# Patient Record
Sex: MALE | Race: OTHER | Employment: FULL TIME | URBAN - NONMETROPOLITAN AREA
[De-identification: names, ages, dates, MRNs, and addresses within clinical notes are randomized per-mention and may not be internally consistent; named-entity substitution may affect disease eponyms.]

---

## 2022-12-17 ENCOUNTER — APPOINTMENT (OUTPATIENT)
Dept: GENERAL RADIOLOGY | Age: 57
End: 2022-12-17
Attending: EMERGENCY MEDICINE

## 2022-12-17 ENCOUNTER — HOSPITAL ENCOUNTER (EMERGENCY)
Age: 57
Discharge: HOME | End: 2022-12-18
Attending: EMERGENCY MEDICINE

## 2022-12-17 VITALS
TEMPERATURE: 98.2 F | WEIGHT: 243.9 LBS | OXYGEN SATURATION: 99 % | DIASTOLIC BLOOD PRESSURE: 84 MMHG | HEIGHT: 66 IN | BODY MASS INDEX: 39.2 KG/M2 | RESPIRATION RATE: 17 BRPM | SYSTOLIC BLOOD PRESSURE: 120 MMHG | HEART RATE: 113 BPM

## 2022-12-17 DIAGNOSIS — E86.0 DEHYDRATION: Primary | ICD-10-CM

## 2022-12-17 LAB
ALBUMIN SERPL-MCNC: 3.2 G/DL (ref 3.5–5)
ALBUMIN/GLOB SERPL: 0.9 {RATIO} (ref 1.1–2.2)
ALP SERPL-CCNC: 123 U/L (ref 45–117)
ALT SERPL-CCNC: 31 U/L (ref 12–78)
ANION GAP SERPL CALC-SCNC: 10 MMOL/L (ref 5–15)
AST SERPL W P-5'-P-CCNC: 17 U/L (ref 15–37)
ATRIAL RATE: 120 BPM
BASOPHILS # BLD: 0 K/UL (ref 0–0.1)
BASOPHILS NFR BLD: 1 % (ref 0–1)
BILIRUB SERPL-MCNC: 0.7 MG/DL (ref 0.2–1)
BUN SERPL-MCNC: 46 MG/DL (ref 6–20)
BUN/CREAT SERPL: 37 (ref 12–20)
CA-I BLD-MCNC: 8.3 MG/DL (ref 8.5–10.1)
CALCULATED P AXIS, ECG09: 45 DEGREES
CALCULATED R AXIS, ECG10: -35 DEGREES
CALCULATED T AXIS, ECG11: 39 DEGREES
CHLORIDE SERPL-SCNC: 103 MMOL/L (ref 97–108)
CO2 SERPL-SCNC: 23 MMOL/L (ref 21–32)
CREAT SERPL-MCNC: 1.26 MG/DL (ref 0.7–1.3)
D DIMER PPP FEU-MCNC: 0.56 MG/L FEU (ref 0.19–0.5)
DIAGNOSIS, 93000: NORMAL
DIFFERENTIAL METHOD BLD: ABNORMAL
EOSINOPHIL # BLD: 0 K/UL (ref 0–0.4)
EOSINOPHIL NFR BLD: 0 % (ref 0–7)
ERYTHROCYTE [DISTWIDTH] IN BLOOD BY AUTOMATED COUNT: 13 % (ref 11.5–14.5)
ETHANOL SERPL-MCNC: <10 MG/DL
FLUAV RNA SPEC QL NAA+PROBE: NOT DETECTED
FLUBV RNA SPEC QL NAA+PROBE: NOT DETECTED
GLOBULIN SER CALC-MCNC: 3.6 G/DL (ref 2–4)
GLUCOSE BLD STRIP.AUTO-MCNC: 299 MG/DL (ref 65–100)
GLUCOSE SERPL-MCNC: 325 MG/DL (ref 65–100)
HCT VFR BLD AUTO: 24.9 % (ref 36.6–50.3)
HGB BLD-MCNC: 8.6 G/DL (ref 12.1–17)
IMM GRANULOCYTES # BLD AUTO: 0.1 K/UL (ref 0–0.04)
IMM GRANULOCYTES NFR BLD AUTO: 1 % (ref 0–0.5)
LACTATE SERPL-SCNC: 2.6 MMOL/L (ref 0.4–2)
LYMPHOCYTES # BLD: 1.1 K/UL (ref 0.8–3.5)
LYMPHOCYTES NFR BLD: 15 % (ref 12–49)
MAGNESIUM SERPL-MCNC: 1.3 MG/DL (ref 1.6–2.4)
MCH RBC QN AUTO: 29.8 PG (ref 26–34)
MCHC RBC AUTO-ENTMCNC: 34.5 G/DL (ref 30–36.5)
MCV RBC AUTO: 86.2 FL (ref 80–99)
MONOCYTES # BLD: 0.2 K/UL (ref 0–1)
MONOCYTES NFR BLD: 3 % (ref 5–13)
NEUTS SEG # BLD: 6.1 K/UL (ref 1.8–8)
NEUTS SEG NFR BLD: 80 % (ref 32–75)
NRBC # BLD: 0 K/UL (ref 0–0.01)
NRBC BLD-RTO: 0 PER 100 WBC
P-R INTERVAL, ECG05: 145 MS
PERFORMED BY, TECHID: ABNORMAL
PLATELET # BLD AUTO: 295 K/UL (ref 150–400)
PMV BLD AUTO: 10.5 FL (ref 8.9–12.9)
POTASSIUM SERPL-SCNC: 5.8 MMOL/L (ref 3.5–5.1)
PROT SERPL-MCNC: 6.8 G/DL (ref 6.4–8.2)
Q-T INTERVAL, ECG07: 316 MS
QRS DURATION, ECG06: 83 MS
QTC CALCULATION (BEZET), ECG08: 447 MS
RBC # BLD AUTO: 2.89 M/UL (ref 4.1–5.7)
SARS-COV-2, COV2: NOT DETECTED
SODIUM SERPL-SCNC: 136 MMOL/L (ref 136–145)
TROPONIN-HIGH SENSITIVITY: 24 NG/L (ref 0–76)
VENTRICULAR RATE, ECG03: 120 BPM
WBC # BLD AUTO: 7.6 K/UL (ref 4.1–11.1)

## 2022-12-17 PROCEDURE — 99285 EMERGENCY DEPT VISIT HI MDM: CPT

## 2022-12-17 PROCEDURE — 93005 ELECTROCARDIOGRAM TRACING: CPT

## 2022-12-17 PROCEDURE — 83735 ASSAY OF MAGNESIUM: CPT

## 2022-12-17 PROCEDURE — 82077 ASSAY SPEC XCP UR&BREATH IA: CPT

## 2022-12-17 PROCEDURE — 96361 HYDRATE IV INFUSION ADD-ON: CPT

## 2022-12-17 PROCEDURE — 82962 GLUCOSE BLOOD TEST: CPT

## 2022-12-17 PROCEDURE — 87040 BLOOD CULTURE FOR BACTERIA: CPT

## 2022-12-17 PROCEDURE — 84484 ASSAY OF TROPONIN QUANT: CPT

## 2022-12-17 PROCEDURE — 85025 COMPLETE CBC W/AUTO DIFF WBC: CPT

## 2022-12-17 PROCEDURE — 83605 ASSAY OF LACTIC ACID: CPT

## 2022-12-17 PROCEDURE — 87636 SARSCOV2 & INF A&B AMP PRB: CPT

## 2022-12-17 PROCEDURE — 36415 COLL VENOUS BLD VENIPUNCTURE: CPT

## 2022-12-17 PROCEDURE — 85379 FIBRIN DEGRADATION QUANT: CPT

## 2022-12-17 PROCEDURE — 74011250636 HC RX REV CODE- 250/636: Performed by: EMERGENCY MEDICINE

## 2022-12-17 PROCEDURE — 80053 COMPREHEN METABOLIC PANEL: CPT

## 2022-12-17 PROCEDURE — 96360 HYDRATION IV INFUSION INIT: CPT

## 2022-12-17 PROCEDURE — 71045 X-RAY EXAM CHEST 1 VIEW: CPT

## 2022-12-17 RX ORDER — SODIUM CHLORIDE 9 MG/ML
150 INJECTION, SOLUTION INTRAVENOUS ONCE
Status: COMPLETED | OUTPATIENT
Start: 2022-12-17 | End: 2022-12-17

## 2022-12-17 RX ADMIN — SODIUM CHLORIDE 1000 ML: 9 INJECTION, SOLUTION INTRAVENOUS at 21:38

## 2022-12-17 RX ADMIN — SODIUM CHLORIDE 1000 ML: 9 INJECTION, SOLUTION INTRAVENOUS at 22:39

## 2022-12-17 RX ADMIN — SODIUM CHLORIDE 150 ML/HR: 9 INJECTION, SOLUTION INTRAVENOUS at 21:37

## 2022-12-18 ENCOUNTER — APPOINTMENT (OUTPATIENT)
Dept: CT IMAGING | Age: 57
End: 2022-12-18
Attending: EMERGENCY MEDICINE

## 2022-12-18 ENCOUNTER — HOSPITAL ENCOUNTER (EMERGENCY)
Age: 57
Discharge: ANOTHER HOSPITAL | End: 2022-12-19
Attending: EMERGENCY MEDICINE

## 2022-12-18 DIAGNOSIS — K92.2 GASTROINTESTINAL HEMORRHAGE, UNSPECIFIED GASTROINTESTINAL HEMORRHAGE TYPE: Primary | ICD-10-CM

## 2022-12-18 LAB
ALBUMIN SERPL-MCNC: 3.4 G/DL (ref 3.5–5)
ALBUMIN/GLOB SERPL: 0.9 {RATIO} (ref 1.1–2.2)
ALP SERPL-CCNC: 114 U/L (ref 45–117)
ALT SERPL-CCNC: 29 U/L (ref 12–78)
ANION GAP SERPL CALC-SCNC: 7 MMOL/L (ref 5–15)
AST SERPL W P-5'-P-CCNC: 17 U/L (ref 15–37)
BACTERIA SPEC CULT: NORMAL
BASOPHILS # BLD: 0 K/UL (ref 0–0.1)
BASOPHILS NFR BLD: 0 % (ref 0–1)
BILIRUB SERPL-MCNC: 0.7 MG/DL (ref 0.2–1)
BUN SERPL-MCNC: 50 MG/DL (ref 6–20)
BUN/CREAT SERPL: 38 (ref 12–20)
CA-I BLD-MCNC: 8.3 MG/DL (ref 8.5–10.1)
CHLORIDE SERPL-SCNC: 102 MMOL/L (ref 97–108)
CO2 SERPL-SCNC: 23 MMOL/L (ref 21–32)
CREAT SERPL-MCNC: 1.31 MG/DL (ref 0.7–1.3)
DIFFERENTIAL METHOD BLD: ABNORMAL
EOSINOPHIL # BLD: 0 K/UL (ref 0–0.4)
EOSINOPHIL NFR BLD: 0 % (ref 0–7)
ERYTHROCYTE [DISTWIDTH] IN BLOOD BY AUTOMATED COUNT: 14 % (ref 11.5–14.5)
GLOBULIN SER CALC-MCNC: 3.6 G/DL (ref 2–4)
GLUCOSE SERPL-MCNC: 275 MG/DL (ref 65–100)
HCT VFR BLD AUTO: 22.9 % (ref 36.6–50.3)
HEMOCCULT SP1 STL QL: POSITIVE
HGB BLD-MCNC: 7.7 G/DL (ref 12.1–17)
IMM GRANULOCYTES # BLD AUTO: 0.1 K/UL (ref 0–0.04)
IMM GRANULOCYTES NFR BLD AUTO: 1 % (ref 0–0.5)
LYMPHOCYTES # BLD: 1.1 K/UL (ref 0.8–3.5)
LYMPHOCYTES NFR BLD: 15 % (ref 12–49)
MCH RBC QN AUTO: 29.4 PG (ref 26–34)
MCHC RBC AUTO-ENTMCNC: 33.6 G/DL (ref 30–36.5)
MCV RBC AUTO: 87.4 FL (ref 80–99)
MONOCYTES # BLD: 0.3 K/UL (ref 0–1)
MONOCYTES NFR BLD: 4 % (ref 5–13)
NEUTS SEG # BLD: 5.7 K/UL (ref 1.8–8)
NEUTS SEG NFR BLD: 80 % (ref 32–75)
NRBC # BLD: 0 K/UL (ref 0–0.01)
NRBC BLD-RTO: 0 PER 100 WBC
PLATELET # BLD AUTO: 274 K/UL (ref 150–400)
PMV BLD AUTO: 10.3 FL (ref 8.9–12.9)
POTASSIUM SERPL-SCNC: 4.4 MMOL/L (ref 3.5–5.1)
PROT SERPL-MCNC: 7 G/DL (ref 6.4–8.2)
RBC # BLD AUTO: 2.62 M/UL (ref 4.1–5.7)
SODIUM SERPL-SCNC: 132 MMOL/L (ref 136–145)
SPECIAL REQUESTS,SREQ: NORMAL
TROPONIN-HIGH SENSITIVITY: 8 NG/L (ref 0–76)
WBC # BLD AUTO: 7.2 K/UL (ref 4.1–11.1)

## 2022-12-18 PROCEDURE — 82272 OCCULT BLD FECES 1-3 TESTS: CPT

## 2022-12-18 PROCEDURE — 93005 ELECTROCARDIOGRAM TRACING: CPT

## 2022-12-18 PROCEDURE — 96375 TX/PRO/DX INJ NEW DRUG ADDON: CPT

## 2022-12-18 PROCEDURE — 96374 THER/PROPH/DIAG INJ IV PUSH: CPT

## 2022-12-18 PROCEDURE — 74176 CT ABD & PELVIS W/O CONTRAST: CPT

## 2022-12-18 PROCEDURE — 74011250636 HC RX REV CODE- 250/636: Performed by: EMERGENCY MEDICINE

## 2022-12-18 PROCEDURE — 85025 COMPLETE CBC W/AUTO DIFF WBC: CPT

## 2022-12-18 PROCEDURE — 80053 COMPREHEN METABOLIC PANEL: CPT

## 2022-12-18 PROCEDURE — 70450 CT HEAD/BRAIN W/O DYE: CPT

## 2022-12-18 PROCEDURE — 99285 EMERGENCY DEPT VISIT HI MDM: CPT

## 2022-12-18 PROCEDURE — 84484 ASSAY OF TROPONIN QUANT: CPT

## 2022-12-18 RX ORDER — ONDANSETRON 2 MG/ML
4 INJECTION INTRAMUSCULAR; INTRAVENOUS
Status: COMPLETED | OUTPATIENT
Start: 2022-12-18 | End: 2022-12-18

## 2022-12-18 RX ORDER — SODIUM CHLORIDE 9 MG/ML
125 INJECTION, SOLUTION INTRAVENOUS ONCE
Status: COMPLETED | OUTPATIENT
Start: 2022-12-18 | End: 2022-12-18

## 2022-12-18 RX ADMIN — ONDANSETRON 4 MG: 2 INJECTION INTRAMUSCULAR; INTRAVENOUS at 22:52

## 2022-12-18 RX ADMIN — SODIUM CHLORIDE 125 ML/HR: 9 INJECTION, SOLUTION INTRAVENOUS at 22:00

## 2022-12-18 NOTE — ED PROVIDER NOTES
Patient presents with complaint of shortness of breath and dizziness , that happened just PTA. He was driving his truck. He denies chest pain ,nausea or vomiting. He says that last year he had a similar episode and was given 2 liters of IV fluids. Patient is not compliant with his diabetes medication. Past Medical History:   Diagnosis Date    Diabetes ([facility] [address]        No past surgical history on file. History reviewed. No pertinent family history. Social History     Socioeconomic History    Marital status: SINGLE     Spouse name: Not on file    Number of children: Not on file    Years of education: Not on file    Highest education level: Not on file   Occupational History    Not on file   Tobacco Use    Smoking status: Never    Smokeless tobacco: Never   Substance and Sexual Activity    Alcohol use: Not Currently    Drug use: Not on file    Sexual activity: Not on file   Other Topics Concern    Not on file   Social History Narrative    Not on file     Social Determinants of Health     Financial Resource Strain: Not on file   Food Insecurity: Not on file   Transportation Needs: Not on file   Physical Activity: Not on file   Stress: Not on file   Social Connections: Not on file   Intimate Partner Violence: Not on file   Housing Stability: Not on file         ALLERGIES: Patient has no known allergies. Review of Systems   Constitutional: Negative. HENT: Negative. Eyes: Negative. Respiratory:  Positive for shortness of breath. Cardiovascular: Negative. Endocrine: Negative. Genitourinary: Negative. Musculoskeletal: Negative. Skin: Negative. Allergic/Immunologic: Negative. Neurological:  Positive for dizziness. Hematological: Negative. Psychiatric/Behavioral: Negative.        Vitals:    12/17/22 2113 12/17/22 2138 12/17/22 2139 12/17/22 2233   BP: (!) 86/72 94/63 94/63 120/84   Pulse: (!) 122 (!) 120 (!) 123 (!) 113   Resp: 19 20 20 17   Temp: (!) 96.1 °F (35.6 °C) SpO2: 100%  99% 99%   Weight: 110.6 kg (243 lb 14.4 oz)      Height: 5' 6\" (1.676 m)               Physical Exam  Vitals and nursing note reviewed. Constitutional:       General: He is not in acute distress. Appearance: He is not ill-appearing. HENT:      Head: Normocephalic and atraumatic. Cardiovascular:      Rate and Rhythm: Normal rate and regular rhythm. Pulses: Normal pulses. Heart sounds: Normal heart sounds. Pulmonary:      Effort: Pulmonary effort is normal.      Breath sounds: Normal breath sounds. Abdominal:      General: Abdomen is flat. Bowel sounds are normal.      Palpations: Abdomen is soft. Musculoskeletal:         General: Normal range of motion. Cervical back: Normal range of motion and neck supple. Skin:     General: Skin is warm and dry. Neurological:      General: No focal deficit present. Mental Status: He is alert and oriented to person, place, and time. Mental status is at baseline.    Psychiatric:         Mood and Affect: Mood normal.         Behavior: Behavior normal.        MDM  Risk of Complications, Morbidity, and/or Mortality  Presenting problems: moderate  Diagnostic procedures: moderate  Management options: moderate    Patient Progress  Patient progress: improved         Procedures

## 2022-12-18 NOTE — ED NOTES
Patient is stable at time of discharge. Due to patient not having transportation, courtesy ride arranged with Elizabeth MASON to get patient back to his hotel. Officer Khoa Degroot arrived to take the patient who ambulates from the ED with all belongings.

## 2022-12-19 ENCOUNTER — APPOINTMENT (OUTPATIENT)
Dept: VASCULAR SURGERY | Age: 57
End: 2022-12-19
Attending: INTERNAL MEDICINE

## 2022-12-19 ENCOUNTER — HOSPITAL ENCOUNTER (INPATIENT)
Age: 57
LOS: 4 days | Discharge: HOME | End: 2022-12-23
Attending: STUDENT IN AN ORGANIZED HEALTH CARE EDUCATION/TRAINING PROGRAM | Admitting: INTERNAL MEDICINE

## 2022-12-19 ENCOUNTER — APPOINTMENT (OUTPATIENT)
Dept: CT IMAGING | Age: 57
End: 2022-12-19
Attending: INTERNAL MEDICINE

## 2022-12-19 VITALS
OXYGEN SATURATION: 100 % | TEMPERATURE: 98 F | BODY MASS INDEX: 35.92 KG/M2 | SYSTOLIC BLOOD PRESSURE: 122 MMHG | RESPIRATION RATE: 17 BRPM | HEIGHT: 68 IN | WEIGHT: 237 LBS | HEART RATE: 98 BPM | DIASTOLIC BLOOD PRESSURE: 75 MMHG

## 2022-12-19 DIAGNOSIS — K92.1 GASTROINTESTINAL HEMORRHAGE WITH MELENA: Primary | ICD-10-CM

## 2022-12-19 PROBLEM — K92.2 ACUTE GI BLEEDING: Status: ACTIVE | Noted: 2022-12-19

## 2022-12-19 LAB
ALBUMIN SERPL-MCNC: 2.8 G/DL (ref 3.5–5)
ALBUMIN/GLOB SERPL: 1 {RATIO} (ref 1.1–2.2)
ALP SERPL-CCNC: 89 U/L (ref 45–117)
ALT SERPL-CCNC: 25 U/L (ref 12–78)
AMMONIA PLAS-SCNC: 21 UMOL/L
AMPHET UR QL SCN: NEGATIVE
ANION GAP SERPL CALC-SCNC: 4 MMOL/L (ref 5–15)
APAP SERPL-MCNC: 4 UG/ML (ref 10–30)
APPEARANCE UR: CLEAR
ARTERIAL PATENCY WRIST A: POSITIVE
AST SERPL-CCNC: 20 U/L (ref 15–37)
ATRIAL RATE: 106 BPM
ATRIAL RATE: 120 BPM
BACTERIA URNS QL MICRO: NEGATIVE /HPF
BARBITURATES UR QL SCN: NEGATIVE
BASE DEFICIT BLD-SCNC: 2.4 MMOL/L
BASOPHILS # BLD: 0 K/UL (ref 0–0.1)
BASOPHILS NFR BLD: 0 % (ref 0–1)
BDY SITE: ABNORMAL
BENZODIAZ UR QL: NEGATIVE
BILIRUB SERPL-MCNC: 0.6 MG/DL (ref 0.2–1)
BILIRUB UR QL: NEGATIVE
BUN SERPL-MCNC: 30 MG/DL (ref 6–20)
BUN/CREAT SERPL: 39 (ref 12–20)
CALCIUM SERPL-MCNC: 7.6 MG/DL (ref 8.5–10.1)
CALCULATED P AXIS, ECG09: 41 DEGREES
CALCULATED P AXIS, ECG09: 45 DEGREES
CALCULATED R AXIS, ECG10: -26 DEGREES
CALCULATED R AXIS, ECG10: -35 DEGREES
CALCULATED T AXIS, ECG11: 39 DEGREES
CALCULATED T AXIS, ECG11: 43 DEGREES
CANNABINOIDS UR QL SCN: NEGATIVE
CHLORIDE SERPL-SCNC: 111 MMOL/L (ref 97–108)
CHOLEST SERPL-MCNC: 141 MG/DL
CO2 SERPL-SCNC: 25 MMOL/L (ref 21–32)
COCAINE UR QL SCN: NEGATIVE
COLOR UR: NORMAL
COMMENT, HOLDF: NORMAL
CREAT SERPL-MCNC: 0.76 MG/DL (ref 0.7–1.3)
DIAGNOSIS, 93000: NORMAL
DIAGNOSIS, 93000: NORMAL
DIFFERENTIAL METHOD BLD: ABNORMAL
DRUG SCRN COMMENT,DRGCM: NORMAL
EOSINOPHIL # BLD: 0.1 K/UL (ref 0–0.4)
EOSINOPHIL NFR BLD: 1 % (ref 0–7)
EPITH CASTS URNS QL MICRO: NORMAL /LPF
ERYTHROCYTE [DISTWIDTH] IN BLOOD BY AUTOMATED COUNT: 14.1 % (ref 11.5–14.5)
FERRITIN SERPL-MCNC: 135 NG/ML (ref 26–388)
FOLATE SERPL-MCNC: 17 NG/ML (ref 5–21)
GAS FLOW.O2 O2 DELIVERY SYS: ABNORMAL L/MIN
GLOBULIN SER CALC-MCNC: 2.7 G/DL (ref 2–4)
GLUCOSE BLD STRIP.AUTO-MCNC: 146 MG/DL (ref 65–117)
GLUCOSE BLD STRIP.AUTO-MCNC: 167 MG/DL (ref 65–117)
GLUCOSE BLD STRIP.AUTO-MCNC: 170 MG/DL (ref 65–117)
GLUCOSE BLD STRIP.AUTO-MCNC: 186 MG/DL (ref 65–117)
GLUCOSE SERPL-MCNC: 171 MG/DL (ref 65–100)
GLUCOSE UR STRIP.AUTO-MCNC: NEGATIVE MG/DL
HCO3 BLD-SCNC: 21.5 MMOL/L (ref 22–26)
HCT VFR BLD AUTO: 20.1 % (ref 36.6–50.3)
HDLC SERPL-MCNC: 36 MG/DL
HDLC SERPL: 3.9 {RATIO} (ref 0–5)
HGB BLD-MCNC: 6.3 G/DL (ref 12.1–17)
HGB UR QL STRIP: NEGATIVE
HISTORY CHECKED?,CKHIST: NORMAL
HYALINE CASTS URNS QL MICRO: NORMAL /LPF (ref 0–5)
IMM GRANULOCYTES # BLD AUTO: 0.1 K/UL (ref 0–0.04)
IMM GRANULOCYTES NFR BLD AUTO: 1 % (ref 0–0.5)
IRON SATN MFR SERPL: 24 % (ref 20–50)
IRON SERPL-MCNC: 65 UG/DL (ref 35–150)
KETONES UR QL STRIP.AUTO: NEGATIVE MG/DL
LACTATE SERPL-SCNC: 1.1 MMOL/L (ref 0.4–2)
LDLC SERPL CALC-MCNC: 69.8 MG/DL (ref 0–100)
LEUKOCYTE ESTERASE UR QL STRIP.AUTO: NEGATIVE
LIPASE SERPL-CCNC: 265 U/L (ref 73–393)
LYMPHOCYTES # BLD: 1.2 K/UL (ref 0.8–3.5)
LYMPHOCYTES NFR BLD: 23 % (ref 12–49)
MCH RBC QN AUTO: 29.4 PG (ref 26–34)
MCHC RBC AUTO-ENTMCNC: 31.3 G/DL (ref 30–36.5)
MCV RBC AUTO: 93.9 FL (ref 80–99)
METHADONE UR QL: NEGATIVE
MONOCYTES # BLD: 0.2 K/UL (ref 0–1)
MONOCYTES NFR BLD: 4 % (ref 5–13)
NEUTS SEG # BLD: 3.7 K/UL (ref 1.8–8)
NEUTS SEG NFR BLD: 71 % (ref 32–75)
NITRITE UR QL STRIP.AUTO: NEGATIVE
NRBC # BLD: 0 K/UL (ref 0–0.01)
NRBC BLD-RTO: 0 PER 100 WBC
OPIATES UR QL: NEGATIVE
P-R INTERVAL, ECG05: 145 MS
P-R INTERVAL, ECG05: 154 MS
PCO2 BLD: 31.6 MMHG (ref 35–45)
PCP UR QL: NEGATIVE
PH BLD: 7.44 [PH] (ref 7.35–7.45)
PH UR STRIP: 5 [PH] (ref 5–8)
PLATELET # BLD AUTO: 200 K/UL (ref 150–400)
PLATELET COMMENTS,PCOM: ABNORMAL
PMV BLD AUTO: 10.2 FL (ref 8.9–12.9)
PO2 BLD: 107 MMHG (ref 80–100)
POTASSIUM SERPL-SCNC: 3.8 MMOL/L (ref 3.5–5.1)
PROT SERPL-MCNC: 5.5 G/DL (ref 6.4–8.2)
PROT UR STRIP-MCNC: NEGATIVE MG/DL
Q-T INTERVAL, ECG07: 316 MS
Q-T INTERVAL, ECG07: 342 MS
QRS DURATION, ECG06: 83 MS
QRS DURATION, ECG06: 89 MS
QTC CALCULATION (BEZET), ECG08: 447 MS
QTC CALCULATION (BEZET), ECG08: 455 MS
RBC # BLD AUTO: 2.14 M/UL (ref 4.1–5.7)
RBC #/AREA URNS HPF: NORMAL /HPF (ref 0–5)
RBC MORPH BLD: ABNORMAL
RBC MORPH BLD: ABNORMAL
SALICYLATES SERPL-MCNC: <1.7 MG/DL (ref 2.8–20)
SAMPLES BEING HELD,HOLD: NORMAL
SAO2 % BLD: 98.4 % (ref 92–97)
SERVICE CMNT-IMP: ABNORMAL
SODIUM SERPL-SCNC: 140 MMOL/L (ref 136–145)
SP GR UR REFRACTOMETRY: 1.02 (ref 1–1.03)
SPECIMEN TYPE: ABNORMAL
TIBC SERPL-MCNC: 271 UG/DL (ref 250–450)
TRIGL SERPL-MCNC: 176 MG/DL (ref ?–150)
TROPONIN-HIGH SENSITIVITY: 9 NG/L (ref 0–76)
TSH SERPL DL<=0.05 MIU/L-ACNC: 1.08 UIU/ML (ref 0.36–3.74)
UROBILINOGEN UR QL STRIP.AUTO: 0.2 EU/DL (ref 0.2–1)
VENTRICULAR RATE, ECG03: 106 BPM
VENTRICULAR RATE, ECG03: 120 BPM
VIT B12 SERPL-MCNC: 227 PG/ML (ref 193–986)
VLDLC SERPL CALC-MCNC: 35.2 MG/DL
WBC # BLD AUTO: 5.3 K/UL (ref 4.1–11.1)
WBC URNS QL MICRO: NORMAL /HPF (ref 0–4)

## 2022-12-19 PROCEDURE — 82746 ASSAY OF FOLIC ACID SERUM: CPT

## 2022-12-19 PROCEDURE — 36430 TRANSFUSION BLD/BLD COMPNT: CPT

## 2022-12-19 PROCEDURE — 83540 ASSAY OF IRON: CPT

## 2022-12-19 PROCEDURE — 74011636637 HC RX REV CODE- 636/637: Performed by: INTERNAL MEDICINE

## 2022-12-19 PROCEDURE — C9113 INJ PANTOPRAZOLE SODIUM, VIA: HCPCS | Performed by: EMERGENCY MEDICINE

## 2022-12-19 PROCEDURE — 74011250636 HC RX REV CODE- 250/636: Performed by: EMERGENCY MEDICINE

## 2022-12-19 PROCEDURE — 74011000636 HC RX REV CODE- 636: Performed by: RADIOLOGY

## 2022-12-19 PROCEDURE — 75810000275 HC EMERGENCY DEPT VISIT NO LEVEL OF CARE

## 2022-12-19 PROCEDURE — C9113 INJ PANTOPRAZOLE SODIUM, VIA: HCPCS | Performed by: INTERNAL MEDICINE

## 2022-12-19 PROCEDURE — 80179 DRUG ASSAY SALICYLATE: CPT

## 2022-12-19 PROCEDURE — 80307 DRUG TEST PRSMV CHEM ANLYZR: CPT

## 2022-12-19 PROCEDURE — 84484 ASSAY OF TROPONIN QUANT: CPT

## 2022-12-19 PROCEDURE — 82607 VITAMIN B-12: CPT

## 2022-12-19 PROCEDURE — 83690 ASSAY OF LIPASE: CPT

## 2022-12-19 PROCEDURE — 84443 ASSAY THYROID STIM HORMONE: CPT

## 2022-12-19 PROCEDURE — 85025 COMPLETE CBC W/AUTO DIFF WBC: CPT

## 2022-12-19 PROCEDURE — 74011000250 HC RX REV CODE- 250: Performed by: INTERNAL MEDICINE

## 2022-12-19 PROCEDURE — P9016 RBC LEUKOCYTES REDUCED: HCPCS

## 2022-12-19 PROCEDURE — 82803 BLOOD GASES ANY COMBINATION: CPT

## 2022-12-19 PROCEDURE — 93970 EXTREMITY STUDY: CPT

## 2022-12-19 PROCEDURE — 36600 WITHDRAWAL OF ARTERIAL BLOOD: CPT

## 2022-12-19 PROCEDURE — 74011250637 HC RX REV CODE- 250/637: Performed by: INTERNAL MEDICINE

## 2022-12-19 PROCEDURE — 96375 TX/PRO/DX INJ NEW DRUG ADDON: CPT

## 2022-12-19 PROCEDURE — 71275 CT ANGIOGRAPHY CHEST: CPT

## 2022-12-19 PROCEDURE — 65660000001 HC RM ICU INTERMED STEPDOWN

## 2022-12-19 PROCEDURE — 74011000250 HC RX REV CODE- 250: Performed by: EMERGENCY MEDICINE

## 2022-12-19 PROCEDURE — 86923 COMPATIBILITY TEST ELECTRIC: CPT

## 2022-12-19 PROCEDURE — 74011250636 HC RX REV CODE- 250/636: Performed by: INTERNAL MEDICINE

## 2022-12-19 PROCEDURE — 82962 GLUCOSE BLOOD TEST: CPT

## 2022-12-19 PROCEDURE — 82728 ASSAY OF FERRITIN: CPT

## 2022-12-19 PROCEDURE — 80143 DRUG ASSAY ACETAMINOPHEN: CPT

## 2022-12-19 PROCEDURE — 80053 COMPREHEN METABOLIC PANEL: CPT

## 2022-12-19 PROCEDURE — 81001 URINALYSIS AUTO W/SCOPE: CPT

## 2022-12-19 PROCEDURE — 83605 ASSAY OF LACTIC ACID: CPT

## 2022-12-19 PROCEDURE — 86900 BLOOD TYPING SEROLOGIC ABO: CPT

## 2022-12-19 PROCEDURE — 80061 LIPID PANEL: CPT

## 2022-12-19 PROCEDURE — 36415 COLL VENOUS BLD VENIPUNCTURE: CPT

## 2022-12-19 PROCEDURE — 82140 ASSAY OF AMMONIA: CPT

## 2022-12-19 RX ORDER — ONDANSETRON 4 MG/1
4 TABLET, ORALLY DISINTEGRATING ORAL
Status: DISCONTINUED | OUTPATIENT
Start: 2022-12-19 | End: 2022-12-23 | Stop reason: HOSPADM

## 2022-12-19 RX ORDER — ACETAMINOPHEN 325 MG/1
650 TABLET ORAL
Status: DISCONTINUED | OUTPATIENT
Start: 2022-12-19 | End: 2022-12-23 | Stop reason: HOSPADM

## 2022-12-19 RX ORDER — DIAZEPAM 10 MG/2ML
5 INJECTION INTRAMUSCULAR
Status: DISCONTINUED | OUTPATIENT
Start: 2022-12-19 | End: 2022-12-23 | Stop reason: HOSPADM

## 2022-12-19 RX ORDER — INSULIN LISPRO 100 [IU]/ML
INJECTION, SOLUTION INTRAVENOUS; SUBCUTANEOUS
Status: DISCONTINUED | OUTPATIENT
Start: 2022-12-19 | End: 2022-12-23 | Stop reason: HOSPADM

## 2022-12-19 RX ORDER — MAGNESIUM SULFATE HEPTAHYDRATE 40 MG/ML
4 INJECTION, SOLUTION INTRAVENOUS ONCE
Status: DISCONTINUED | OUTPATIENT
Start: 2022-12-19 | End: 2022-12-19 | Stop reason: CLARIF

## 2022-12-19 RX ORDER — MAGNESIUM SULFATE HEPTAHYDRATE 40 MG/ML
2 INJECTION, SOLUTION INTRAVENOUS ONCE
Status: COMPLETED | OUTPATIENT
Start: 2022-12-19 | End: 2022-12-19

## 2022-12-19 RX ORDER — POLYETHYLENE GLYCOL 3350 17 G/17G
17 POWDER, FOR SOLUTION ORAL DAILY PRN
Status: DISCONTINUED | OUTPATIENT
Start: 2022-12-19 | End: 2022-12-23 | Stop reason: HOSPADM

## 2022-12-19 RX ORDER — SODIUM CHLORIDE 0.9 % (FLUSH) 0.9 %
5-40 SYRINGE (ML) INJECTION EVERY 8 HOURS
Status: DISCONTINUED | OUTPATIENT
Start: 2022-12-19 | End: 2022-12-23 | Stop reason: HOSPADM

## 2022-12-19 RX ORDER — SODIUM CHLORIDE 9 MG/ML
250 INJECTION, SOLUTION INTRAVENOUS AS NEEDED
Status: DISCONTINUED | OUTPATIENT
Start: 2022-12-19 | End: 2022-12-23 | Stop reason: HOSPADM

## 2022-12-19 RX ORDER — CHLORDIAZEPOXIDE HYDROCHLORIDE 25 MG/1
25 CAPSULE, GELATIN COATED ORAL 3 TIMES DAILY
Status: DISCONTINUED | OUTPATIENT
Start: 2022-12-19 | End: 2022-12-20

## 2022-12-19 RX ORDER — ONDANSETRON 2 MG/ML
4 INJECTION INTRAMUSCULAR; INTRAVENOUS
Status: DISCONTINUED | OUTPATIENT
Start: 2022-12-19 | End: 2022-12-23 | Stop reason: HOSPADM

## 2022-12-19 RX ORDER — MAGNESIUM SULFATE 100 %
4 CRYSTALS MISCELLANEOUS AS NEEDED
Status: DISCONTINUED | OUTPATIENT
Start: 2022-12-19 | End: 2022-12-23 | Stop reason: HOSPADM

## 2022-12-19 RX ORDER — SODIUM CHLORIDE, SODIUM LACTATE, POTASSIUM CHLORIDE, CALCIUM CHLORIDE 600; 310; 30; 20 MG/100ML; MG/100ML; MG/100ML; MG/100ML
125 INJECTION, SOLUTION INTRAVENOUS CONTINUOUS
Status: DISCONTINUED | OUTPATIENT
Start: 2022-12-19 | End: 2022-12-19

## 2022-12-19 RX ORDER — ACETAMINOPHEN 650 MG/1
650 SUPPOSITORY RECTAL
Status: DISCONTINUED | OUTPATIENT
Start: 2022-12-19 | End: 2022-12-23 | Stop reason: HOSPADM

## 2022-12-19 RX ORDER — SODIUM CHLORIDE 0.9 % (FLUSH) 0.9 %
5-40 SYRINGE (ML) INJECTION AS NEEDED
Status: DISCONTINUED | OUTPATIENT
Start: 2022-12-19 | End: 2022-12-23 | Stop reason: HOSPADM

## 2022-12-19 RX ADMIN — SODIUM CHLORIDE 40 MG: 9 INJECTION, SOLUTION INTRAMUSCULAR; INTRAVENOUS; SUBCUTANEOUS at 00:28

## 2022-12-19 RX ADMIN — MAGNESIUM SULFATE HEPTAHYDRATE 2 G: 40 INJECTION, SOLUTION INTRAVENOUS at 14:44

## 2022-12-19 RX ADMIN — CHLORDIAZEPOXIDE HYDROCHLORIDE 25 MG: 25 CAPSULE ORAL at 21:53

## 2022-12-19 RX ADMIN — SODIUM CHLORIDE, PRESERVATIVE FREE 10 ML: 5 INJECTION INTRAVENOUS at 21:53

## 2022-12-19 RX ADMIN — Medication 2 UNITS: at 19:01

## 2022-12-19 RX ADMIN — SODIUM CHLORIDE, PRESERVATIVE FREE 10 ML: 5 INJECTION INTRAVENOUS at 16:37

## 2022-12-19 RX ADMIN — SODIUM CHLORIDE, PRESERVATIVE FREE 10 ML: 5 INJECTION INTRAVENOUS at 14:43

## 2022-12-19 RX ADMIN — FOLIC ACID: 5 INJECTION, SOLUTION INTRAMUSCULAR; INTRAVENOUS; SUBCUTANEOUS at 10:03

## 2022-12-19 RX ADMIN — PANTOPRAZOLE SODIUM 40 MG: 40 INJECTION, POWDER, FOR SOLUTION INTRAVENOUS at 21:53

## 2022-12-19 RX ADMIN — IOPAMIDOL 80 ML: 755 INJECTION, SOLUTION INTRAVENOUS at 13:16

## 2022-12-19 RX ADMIN — CHLORDIAZEPOXIDE HYDROCHLORIDE 25 MG: 25 CAPSULE ORAL at 14:44

## 2022-12-19 RX ADMIN — MAGNESIUM SULFATE HEPTAHYDRATE 2 G: 40 INJECTION, SOLUTION INTRAVENOUS at 18:53

## 2022-12-19 NOTE — ED NOTES
Contacted [facility] to make aware of patient transfer, so they could hold his belongings until he could pick them up later.

## 2022-12-19 NOTE — ROUTINE PROCESS
TRANSFER - OUT REPORT:    Verbal report given to Rl Lindo (name) on Otila Sheffield  being transferred to [facility] (unit) for routine progression of care       Report consisted of patients Situation, Background, Assessment and   Recommendations(SBAR). Information from the following report(s) SBAR, Kardex, ED Summary, MAR, and Cardiac Rhythm - Sinus Rhythm   was reviewed with the receiving nurse. Lines:   Peripheral IV 12/19/22 Right Antecubital (Active)       Peripheral IV 12/19/22 Left Antecubital (Active)        Opportunity for questions and clarification was provided.       Patient transported with:   Monitor  Registered Nurse

## 2022-12-19 NOTE — ED PROVIDER NOTES
Patient is an interstate  , who returns to ER for complaint of generalized weakness. He was seen last night for weakness and dizziness and treated for dehydration. He admits to regular alcohol use and denies any abdominal pain vomiting , diarrhea, rectal bleeding or dark stools. Past Medical History:   Diagnosis Date    Diabetes ([facility] [address]        No past surgical history on file. History reviewed. No pertinent family history. Social History     Socioeconomic History    Marital status: SINGLE     Spouse name: Not on file    Number of children: Not on file    Years of education: Not on file    Highest education level: Not on file   Occupational History    Not on file   Tobacco Use    Smoking status: Never    Smokeless tobacco: Never   Substance and Sexual Activity    Alcohol use: Not Currently    Drug use: Not on file    Sexual activity: Not on file   Other Topics Concern    Not on file   Social History Narrative    Not on file     Social Determinants of Health     Financial Resource Strain: Not on file   Food Insecurity: Not on file   Transportation Needs: Not on file   Physical Activity: Not on file   Stress: Not on file   Social Connections: Not on file   Intimate Partner Violence: Not on file   Housing Stability: Not on file         ALLERGIES: Patient has no known allergies. Review of Systems   Constitutional:  Positive for fatigue. HENT: Negative. Eyes: Negative. Respiratory: Negative. Cardiovascular: Negative. Gastrointestinal:  Negative for abdominal pain, anal bleeding, blood in stool, constipation, diarrhea, nausea and vomiting. Endocrine: Negative. Genitourinary: Negative. Musculoskeletal: Negative. Skin: Negative. Allergic/Immunologic: Negative. Neurological:  Positive for weakness. Hematological: Negative. Psychiatric/Behavioral: Negative. All other systems reviewed and are negative.     Vitals:    12/18/22 2138 12/18/22 2147   BP: 125/88 Pulse: (!) 112    Resp: 18    Temp: 98 °F (36.7 °C)    SpO2: 99% 99%   Weight: 107.5 kg (237 lb)    Height: 5' 8\" (1.727 m)             Physical Exam  Vitals and nursing note reviewed. HENT:      Head: Normocephalic and atraumatic. Eyes:      Extraocular Movements: Extraocular movements intact. Pupils: Pupils are equal, round, and reactive to light. Cardiovascular:      Rate and Rhythm: Normal rate and regular rhythm. Pulses: Normal pulses. Heart sounds: Normal heart sounds. Pulmonary:      Effort: Pulmonary effort is normal.      Breath sounds: Normal breath sounds. Abdominal:      General: Abdomen is flat. Bowel sounds are normal.      Palpations: Abdomen is soft. Genitourinary:     Rectum: Guaiac result positive. Musculoskeletal:         General: Normal range of motion. Cervical back: Normal range of motion and neck supple. Skin:     General: Skin is warm and dry. Neurological:      General: No focal deficit present. Mental Status: Mental status is at baseline.       Comments: Awake but slow   Psychiatric:         Mood and Affect: Mood normal.         Behavior: Behavior normal.        MDM     Amount and/or Complexity of Data Reviewed  Clinical lab tests: ordered and reviewed  Tests in the radiology section of CPT®: ordered and reviewed  Discuss the patient with other providers: (Discussed with Dr Arnulfo Robert at [facility] and he accepts patient as a transfer. )    Risk of Complications, Morbidity, and/or Mortality  Presenting problems: moderate  Diagnostic procedures: moderate  Management options: moderate    Patient Progress  Patient progress: stable         Procedures

## 2022-12-19 NOTE — PROGRESS NOTES
Patient admitted earlier this AM by night team. Refer to H&P. Pt seen and examined. Hgb now dropped to 6.3, will order 1 PRBC. No evidence of active bleeding. Pt in NAD, denies abd pain, N/V, melena or BRBPR. AFVSS. GI to see. Cont PPI. Monitor CIWA. No tremor on exam. Start Librium. Replete Mag.     Jackelyn Carbajal MD

## 2022-12-19 NOTE — PROGRESS NOTES
Primary Nurse Halle Ram RN and Galileo Almanzar RN performed a dual skin assessment on this patient No impairment noted  Jay score is 15     Pt has medium size (1cm x 1cm) skin tag on gluteal cleft/L sacrum area. Appears to be not bothersome to patient.

## 2022-12-19 NOTE — ED NOTES
Report given to DESTINEE Stapleton, of [facility]. Also called updated report to [facility] ED staff.

## 2022-12-19 NOTE — ED TRIAGE NOTES
Pt brought in per EMS from [facility] for eval generalized weakness and nausea. Pt was seen last night for SOB and nausea. EMS reports FSBS 389. Pt reports nausea, but states he's \"very thirsty. \" Pt has hx diabetes but is noncompliant with medications, reporting previously that he stopped taking metformin r/t side effects. Pt denies pain upon triage. EMS reports pt was able to ambulate to their stretcher, and denied any gait abnormality, only stating that he \"moved slowly. \"

## 2022-12-19 NOTE — CONSULTS
[address] [facility].   [address] [facility] [address]        GASTROENTEROLOGY CONSULTATION NOTE  Will Emiliano Amador  [phone] office  [phone] NP/PA in-hospital cell phone M-F until 4:30PM  After 5PM or on weekends, please call  for physician on call        NAME:  Susan Baldwin   :   1965   MRN:   [id number]       Referring Physician: Dr. Mat Welsh Date: 2022 11:54 AM     History of Present Illness:  Patient is a 59 y.o. who is seen in consultation at the request of Dr. Georgia Mueller for acute upper GI bleed. Patient has a past medical history significant for diabetes mellitus. He presented to the ED with complaints of fatigue and weakness. He was transferred to [facility] for admission today, 22 for acute GI bleed, alcohol withdrawal, acute blood loss anemia, diabetes mellitus, hyponatremia, acute kidney injury, elevated D-dimer, and elevated lactic acid. History is limited from the patient. Patient is drowsy with confusion. Denies nausea, reflux, vomiting, or abdominal pain. Denies hematochezia or melena. Denies fevers, chills, or unexplained weight loss. Discussed with RN and no signs of active GI bleeding. Denies NSAID use or anticoagulation. Denies alcohol, tobacco, or drug use. History of reported alcohol abuse. Denies history of EGD/colonoscopy (no records). I have reviewed the emergency room note, hospital admission note, notes by all other clinicians who have seen the patient during this hospitalization to date. I have reviewed the problem list and the reason for this hospitalization. I have reviewed the allergies and the medications the patient was taking at home prior to this hospitalization. PMH:  Past Medical History:   Diagnosis Date    Diabetes ([address]        PSH:  No past surgical history on file.     Allergies:  No Known Allergies    Home Medications:  None       Hospital Medications:  Current Facility-Administered Medications   Medication Dose Route Frequency    sodium chloride (NS) flush 5-40 mL  5-40 mL IntraVENous Q8H    sodium chloride (NS) flush 5-40 mL  5-40 mL IntraVENous PRN    acetaminophen (TYLENOL) tablet 650 mg  650 mg Oral Q6H PRN    Or    acetaminophen (TYLENOL) suppository 650 mg  650 mg Rectal Q6H PRN    polyethylene glycol (MIRALAX) packet 17 g  17 g Oral DAILY PRN    ondansetron (ZOFRAN ODT) tablet 4 mg  4 mg Oral Q8H PRN    Or    ondansetron (ZOFRAN) injection 4 mg  4 mg IntraVENous Q6H PRN    insulin lispro (HUMALOG) injection   SubCUTAneous AC&HS    glucose chewable tablet 16 g  4 Tablet Oral PRN    glucagon (GLUCAGEN) injection 1 mg  1 mg IntraMUSCular PRN    pantoprazole (PROTONIX) 40 mg in 0.9% sodium chloride 10 mL injection  40 mg IntraVENous Q12H    dextrose 10 % infusion 0-250 mL  0-250 mL IntraVENous PRN    lactated Ringers infusion  125 mL/hr IntraVENous CONTINUOUS    lactated Ringers 9,533 mL with folic acid 1 mg, thiamine 100 mg, mvi (adult no. 4 with vit K) 10 mL infusion   IntraVENous DAILY    diazePAM (VALIUM) injection 5 mg  5 mg IntraVENous Q4H PRN    iopamidoL (ISOVUE-370) 370 mg iodine /mL (76 %) injection 100 mL  100 mL IntraVENous RAD ONCE       Social History:  Social History     Tobacco Use    Smoking status: Never    Smokeless tobacco: Never   Substance Use Topics    Alcohol use: Not Currently       Family History:  No family history on file.     Review of Systems:  Unable to obtain due to altered mental status    Objective:   Patient Vitals for the past 8 hrs:   BP Temp Pulse Resp SpO2   12/19/22 1122 120/84 97.7 °F (36.5 °C) 90 17 98 %   12/19/22 1000 (!) 151/95 97.9 °F (36.6 °C) 91 12 --   12/19/22 0634 109/60 -- 93 17 --   12/19/22 0604 100/64 -- 93 15 --   12/19/22 0534 100/68 -- 82 16 --   12/19/22 0506 101/66 -- 84 14 --   12/19/22 0451 96/67 -- 85 14 --   12/19/22 0436 121/81 -- 86 12 --   12/19/22 0415 112/80 -- 86 18 --   12/19/22 0406 (!) 129/93 -- 90 14 --     No intake/output data recorded. No intake/output data recorded. EXAM:     CONST:  Pleasant male lying in bed, no acute distress   NEURO:  Awakens, drowsy, confused   HEENT: No scleral icterus   LUNGS: No acute respiratory distress   CARD:  S1 S2   ABD:  Soft, non distended, no tenderness, no rebound, no guarding. + Bowel sounds. EXT:  Warm   PSYCH: Not anxious or agitated     Data Review     Recent Labs     12/18/22 2140 12/17/22 2129   WBC 7.2 7.6   HGB 7.7* 8.6*   HCT 22.9* 24.9*    295     Recent Labs     12/18/22 2140 12/17/22 2129   * 136   K 4.4 5.8*    103   CO2 23 23   BUN 50* 46*   CREA 1.31* 1.26   * 325*   CA 8.3* 8.3*     Recent Labs     12/18/22 2140 12/17/22 2129    123*   TP 7.0 6.8   ALB 3.4* 3.2*   GLOB 3.6 3.6     No results for input(s): INR, PTP, APTT, INREXT in the last 72 hours. Assessment:     Anemia with hemoccult positive stool: Hgb 7.7 yesterday (8.6 on 12/17/22), platelets 951, BUN 50 (creatinine 1.31). CT abdomen/pelvis without contrast (12/18/22): no acute process; hepatic steatosis. Reported alcohol abuse  Diabetes mellitus  Elevated D-dimer: CTA chest pending     Patient Active Problem List   Diagnosis Code    Acute GI bleeding K92.2     Plan:     PPI BID  Trend CBC and transfuse as necessary. Monitor for signs of active GI bleeding. CTA pending for elevated D-dimer  Next consider EGD, no immediate plan.    Patient was discussed with Dr. Delon Barthel  Thank you for allowing me to participate in care of [address] [place] [address]     Signed By: Ashli Truong     12/19/2022  11:54 AM      Agree with above    Will follow

## 2022-12-19 NOTE — ED NOTES
Accompanied Dr. Matthew Fermin to room to assist with performing rectal exam and obtaining hemoccult specimen. Specimen noted to be black. Pt tolerated without difficulty. Specimen taken to lab for resulting. Pt did admit to drinking alcohol heavily when he's not working.

## 2022-12-19 NOTE — ED PROVIDER NOTES
Patient is a 78-year-old male present emergency department via transfer for GI bleed. Patient been seen twice in the emergency department for weakness and fatigue. On the patient's second visit blood work showed that patient had a drop in his hemoglobin and guaiac was positive. Received report from Dr. Rhonda Russ at outside ER stating the patient is a  has a significant history of alcohol abuse patient on arrival here somewhat confused, somnolent. Past Medical History:   Diagnosis Date    Diabetes ([address]        No past surgical history on file. No family history on file. Social History     Socioeconomic History    Marital status: SINGLE     Spouse name: Not on file    Number of children: Not on file    Years of education: Not on file    Highest education level: Not on file   Occupational History    Not on file   Tobacco Use    Smoking status: Never    Smokeless tobacco: Never   Substance and Sexual Activity    Alcohol use: Not Currently    Drug use: Not on file    Sexual activity: Not on file   Other Topics Concern    Not on file   Social History Narrative    Not on file     Social Determinants of Health     Financial Resource Strain: Not on file   Food Insecurity: Not on file   Transportation Needs: Not on file   Physical Activity: Not on file   Stress: Not on file   Social Connections: Not on file   Intimate Partner Violence: Not on file   Housing Stability: Not on file         ALLERGIES: Patient has no known allergies. Review of Systems   Gastrointestinal:  Positive for abdominal pain. All other systems reviewed and are negative. Vitals:    12/19/22 0214   BP: 120/82   Pulse: 96   Resp: 21   Temp: 98.5 °F (36.9 °C)   SpO2: 98%            Physical Exam  Vitals and nursing note reviewed. Constitutional:       Appearance: Normal appearance. HENT:      Head: Normocephalic and atraumatic. Eyes:      Extraocular Movements: Extraocular movements intact.       Pupils: Pupils are equal, round, and reactive to light. Cardiovascular:      Rate and Rhythm: Normal rate and regular rhythm. Pulmonary:      Effort: Pulmonary effort is normal.      Breath sounds: Normal breath sounds. Abdominal:      Tenderness: There is abdominal tenderness in the epigastric area. Musculoskeletal:         General: Normal range of motion. Cervical back: Normal range of motion and neck supple. Skin:     General: Skin is warm and dry. Neurological:      General: No focal deficit present. Mental Status: He is alert and oriented to person, place, and time. Psychiatric:         Mood and Affect: Mood normal.         Behavior: Behavior normal.        MDM  Number of Diagnoses or Management Options  Gastrointestinal hemorrhage with melena  Diagnosis management comments: GI bleed, alcohol abuse. 52-year-old male present emergency department via transfer for GI bleed. Patient will be admitted to [facility] service           Procedures        [address] [place] [address] for Admission  2:32 AM    ED Room Number: CP31/31  Patient Name and age:  Julio C Hansen 59 y.o.  male  Working Diagnosis:   1.  Gastrointestinal hemorrhage with melena        COVID-19 Suspicion:  no  Sepsis present:  no  Reassessment needed: no  Code Status:  Full Code  Readmission: no  Isolation Requirements:  no  Recommended Level of Care:  med/surg  Department:[facility] Adult ED - 21   Other:

## 2022-12-19 NOTE — ED NOTES
Pt accepted for ED to ED transfer at this time to [facility]. Awaiting ETA for transport.

## 2022-12-19 NOTE — H&P
[address]  HISTORY AND PHYSICAL    Name:  Jaskaran Salas  MR#:  [id number]  :  1965  ACCOUNT #:  [de-identified]  ADMIT DATE:  2022      The patient was seen, evaluated, and admitted by me on 2022. PRIMARY CARE PHYSICIAN:  Unknown. SOURCE OF INFORMATION:  The patient and review of ED electronic medical records. CHIEF COMPLAINT:  Weakness and fatigue. HISTORY OF PRESENT ILLNESS:  This 67-year-old man with past medical history significant for type 2 diabetes presented at the [facility] emergency room with weakness and fatigue. The patient was initially seen and discharged from the emergency room. The patient came back for the second time because of the worsening weakness and fatigue. The weakness was described as generalized nonfocal weakness. When the patient arrived at the [facility] emergency room for the second time, the patient had a drop in his hemoglobin by one gram and he was also guaiac positive. The patient has history of significant alcohol abuse. The patient was then sent to [facility] emergency room to be evaluated for admission. The patient has no record of prior admission to this hospital.  When the patient arrived at the emergency room, there was a further drop in the patient's hemoglobin from 8.6 to 7.7. The patient was referred to the hospitalist service for admission. No associated abdominal pain. The patient denies past history of GI bleed. The patient appears a little bit confused and unable to provide good history. It is not clear whether the patient has had colonoscopy done in the past.    PAST MEDICAL HISTORY:  Type 2 diabetes. ALLERGIES:  NO KNOWN DRUG ALLERGIES. MEDICATIONS:  The patient is not on any medication at home. FAMILY HISTORY:  This was reviewed. His father had diabetes. PAST SURGICAL HISTORY:  Not significant.     SOCIAL HISTORY:  The patient admits to significant alcohol consumption almost on a daily basis. No history of tobacco abuse. REVIEW OF SYSTEMS:  HEAD, EYES, EARS, NOSE AND THROAT:  No headache, no dizziness, no blurring of vision, and no photophobia. RESPIRATORY SYSTEM:  No cough, no shortness of breath, and no hemoptysis. CARDIOVASCULAR SYSTEM:  No chest pain, no orthopnea, and no palpitation. GASTROINTESTINAL SYSTEM:  No nausea or vomiting, no diarrhea, and no constipation. GENITOURINARY SYSTEM:  No dysuria, no urgency and no frequency. All other systems are reviewed and they are negative. PHYSICAL EXAMINATION:  GENERAL APPEARANCE:  The patient appeared ill and in moderate distress. VITAL SIGNS:  On arrival at the emergency room; temperature 98.5, pulse 96, respiratory rate 21, blood pressure 120/82, and oxygen saturation 98%. HEAD:  Normocephalic, atraumatic. EYES:  Normal eye movement. No redness, no drainage, and no discharge. EARS:  Normal external ears with no obvious drainage. NOSE:  No deformity and no drainage. MOUTH AND THROAT:  No visible oral lesion. Dry oral mucosa. NECK:  Neck is supple. No JVD and no thyromegaly. CHEST:  Clear breath sounds. No wheezing and no crackles. HEART:  Normal S1 and S2, regular. No clinically appreciable murmur. ABDOMEN:  Soft and nontender. Normal bowel sounds. CNS:  Alert and confused. No gross focal neurological deficit. EXTREMITIES:  No edema. Pulses 2+ bilaterally. MUSCULOSKELETAL SYSTEM:  No obvious joint deformity and swelling. SKIN:  No active skin lesions seen in the exposed part of the body. PSYCHIATRY:  Normal mood and affect. LYMPHATIC SYSTEM:  No cervical lymphadenopathy. DIAGNOSTIC DATA:  The EKG shows sinus tachycardia and nonspecific ST and T-waves abnormalities. Chest x-ray shows shallow volumes and bibasilar atelectasis. The CT scan of the head without contrast negative for acute pathology.   The CT scan of the abdomen and pelvis without contrast, no acute intraperitoneal process is identified. LABORATORY DATA:  Stool occult blood positive. Chemistry, sodium 132, potassium 3.4, chloride 102, CO2 is 23, glucose 275, BUN 50, creatinine 1.30, calcium 8.3, total bilirubin 0.7, AST 17, ALT 29, alkaline phosphatase 114, total protein 7.0, albumin level 3.4, and globulin 3.6. Cardiac profile, troponin high-sensitivity 8. Hematology; WBC 7.2, hemoglobin 7.7, hematocrit 22.9, and platelets 698. D-dimer 0.56. Lactic acid level 2.6. Cardiac profile, troponin high-sensitivity 24. ASSESSMENT:  1. Acute lower gastrointestinal bleed. 2.  Alcohol withdrawal delirium. 3.  Acute blood loss anemia. 4.  Type 2 diabetes with hyperglycemia. 5.  Hyponatremia. 6.  Acute kidney injury. 7.  Sinus tachycardia. 8.  Elevated D-dimer. 9.  Elevated lactic acid level. PLAN:  1. Acute lower GI bleed. We will admit the patient for further evaluation and treatment. We will start the patient on Protonix. The patient's BUN to creatinine ratio is highly suggestive of acute upper GI bleed. Gastroenterology consult will be requested. The patient will be n.p.o. in anticipation of intervention by the gastroenterologist.  The patient will be closely monitored. 2.  Alcohol withdrawal delirium. The patient advised to cut back on alcohol consumption. We will start the patient on CIWA protocol. The patient will also be started on banana bag.  3.  Acute blood loss anemia. This is most likely as a result of the acute upper GI bleed. We will monitor the patient's hemoglobin and hematocrit closely. We will transfuse as indicated. We will also carry out anemia workup to evaluate the patient for other possible causes of anemia. 4.  Type 2 diabetes. We will place the patient on sliding scale with insulin coverage. The patient presented with random blood sugar of 275. The patient is not on any medication at home. This is most likely new-onset type 2 diabetes. We will check A1c level. 5.  Hyponatremia. This is most likely due to volume depletion. We will carry out fluid therapy and monitor the patient's sodium level. 6.  Acute kidney injury. This is most likely prerenal due to volume depletion. We will carry out fluid therapy. The CT scan of the abdomen and pelvis did not show any obstructive lesion. 7.  Sinus tachycardia. This is most likely due to alcohol withdrawal.  We will identify and treat underlying etiological factors including fluid therapy. We will check urine drug screen. We will check TSH level. We will carry out fluid therapy. We will also check serial cardiac markers to rule out acute myocardial infarction. 8.  Elevated D-dimer. We will obtain CTA of the chest to evaluate the patient for pulmonary embolism. We will also check ultrasound of the lower extremity for DVT. 9.  Elevated lactic acid level. The patient presented with lactic acid level of 2.6. This is mild. It is most likely due to volume depletion. We will carry out fluid therapy and repeat the patient's lactic acid level. 10.  Other issues. Code status, the patient is a full code. We will request SCD for DVT prophylaxis. FUNCTIONAL STATUS PRIOR TO ADMISSION:  The patient came from home. The patient is ambulatory with no assistive device. COVID PRECAUTION:  The patient was wearing a face mask. I was wearing a face mask and gloves for this patient's encounter.       Bisi Colón MD      [id number]  D:  12/19/2022 7:05  T:  12/19/2022 8:17  JOB #:  [id number]

## 2022-12-20 LAB
ABO + RH BLD: NORMAL
ANION GAP SERPL CALC-SCNC: 4 MMOL/L (ref 5–15)
BLD PROD TYP BPU: NORMAL
BLD PROD TYP BPU: NORMAL
BLOOD GROUP ANTIBODIES SERPL: NORMAL
BPU ID: NORMAL
BPU ID: NORMAL
BUN SERPL-MCNC: 19 MG/DL (ref 6–20)
BUN/CREAT SERPL: 30 (ref 12–20)
CALCIUM SERPL-MCNC: 8.1 MG/DL (ref 8.5–10.1)
CHLORIDE SERPL-SCNC: 112 MMOL/L (ref 97–108)
CO2 SERPL-SCNC: 25 MMOL/L (ref 21–32)
CREAT SERPL-MCNC: 0.64 MG/DL (ref 0.7–1.3)
CROSSMATCH RESULT,%XM: NORMAL
CROSSMATCH RESULT,%XM: NORMAL
ERYTHROCYTE [DISTWIDTH] IN BLOOD BY AUTOMATED COUNT: 14.1 % (ref 11.5–14.5)
EST. AVERAGE GLUCOSE BLD GHB EST-MCNC: 200 MG/DL
GLUCOSE BLD STRIP.AUTO-MCNC: 121 MG/DL (ref 65–117)
GLUCOSE BLD STRIP.AUTO-MCNC: 124 MG/DL (ref 65–117)
GLUCOSE BLD STRIP.AUTO-MCNC: 152 MG/DL (ref 65–117)
GLUCOSE BLD STRIP.AUTO-MCNC: 182 MG/DL (ref 65–117)
GLUCOSE SERPL-MCNC: 115 MG/DL (ref 65–100)
HBA1C MFR BLD: 8.6 % (ref 4–5.6)
HCT VFR BLD AUTO: 22.1 % (ref 36.6–50.3)
HCT VFR BLD AUTO: 29.4 % (ref 36.6–50.3)
HGB BLD-MCNC: 7 G/DL (ref 12.1–17)
HGB BLD-MCNC: 9.5 G/DL (ref 12.1–17)
MAGNESIUM SERPL-MCNC: 2.5 MG/DL (ref 1.6–2.4)
MCH RBC QN AUTO: 29.8 PG (ref 26–34)
MCHC RBC AUTO-ENTMCNC: 31.7 G/DL (ref 30–36.5)
MCV RBC AUTO: 94 FL (ref 80–99)
NRBC # BLD: 0.02 K/UL (ref 0–0.01)
NRBC BLD-RTO: 0.4 PER 100 WBC
PHOSPHATE SERPL-MCNC: 2.8 MG/DL (ref 2.6–4.7)
PLATELET # BLD AUTO: 193 K/UL (ref 150–400)
PMV BLD AUTO: 10.4 FL (ref 8.9–12.9)
POTASSIUM SERPL-SCNC: 3.4 MMOL/L (ref 3.5–5.1)
RBC # BLD AUTO: 2.35 M/UL (ref 4.1–5.7)
SERVICE CMNT-IMP: ABNORMAL
SODIUM SERPL-SCNC: 141 MMOL/L (ref 136–145)
SPECIMEN EXP DATE BLD: NORMAL
STATUS OF UNIT,%ST: NORMAL
STATUS OF UNIT,%ST: NORMAL
UNIT DIVISION, %UDIV: 0
UNIT DIVISION, %UDIV: 0
WBC # BLD AUTO: 5.5 K/UL (ref 4.1–11.1)

## 2022-12-20 PROCEDURE — 74011250636 HC RX REV CODE- 250/636: Performed by: HOSPITALIST

## 2022-12-20 PROCEDURE — 85018 HEMOGLOBIN: CPT

## 2022-12-20 PROCEDURE — 84100 ASSAY OF PHOSPHORUS: CPT

## 2022-12-20 PROCEDURE — 74011000250 HC RX REV CODE- 250: Performed by: INTERNAL MEDICINE

## 2022-12-20 PROCEDURE — 36415 COLL VENOUS BLD VENIPUNCTURE: CPT

## 2022-12-20 PROCEDURE — 74011636637 HC RX REV CODE- 636/637: Performed by: INTERNAL MEDICINE

## 2022-12-20 PROCEDURE — 80048 BASIC METABOLIC PNL TOTAL CA: CPT

## 2022-12-20 PROCEDURE — 82962 GLUCOSE BLOOD TEST: CPT

## 2022-12-20 PROCEDURE — 83036 HEMOGLOBIN GLYCOSYLATED A1C: CPT

## 2022-12-20 PROCEDURE — 74011250636 HC RX REV CODE- 250/636: Performed by: INTERNAL MEDICINE

## 2022-12-20 PROCEDURE — 74011250637 HC RX REV CODE- 250/637: Performed by: INTERNAL MEDICINE

## 2022-12-20 PROCEDURE — C9113 INJ PANTOPRAZOLE SODIUM, VIA: HCPCS | Performed by: INTERNAL MEDICINE

## 2022-12-20 PROCEDURE — 65660000001 HC RM ICU INTERMED STEPDOWN

## 2022-12-20 PROCEDURE — 83735 ASSAY OF MAGNESIUM: CPT

## 2022-12-20 PROCEDURE — 85027 COMPLETE CBC AUTOMATED: CPT

## 2022-12-20 RX ORDER — POTASSIUM CHLORIDE 7.45 MG/ML
10 INJECTION INTRAVENOUS
Status: COMPLETED | OUTPATIENT
Start: 2022-12-20 | End: 2022-12-20

## 2022-12-20 RX ADMIN — SODIUM CHLORIDE, PRESERVATIVE FREE 10 ML: 5 INJECTION INTRAVENOUS at 13:49

## 2022-12-20 RX ADMIN — CHLORDIAZEPOXIDE HYDROCHLORIDE 25 MG: 25 CAPSULE ORAL at 10:10

## 2022-12-20 RX ADMIN — PANTOPRAZOLE SODIUM 40 MG: 40 INJECTION, POWDER, FOR SOLUTION INTRAVENOUS at 21:00

## 2022-12-20 RX ADMIN — POTASSIUM CHLORIDE 10 MEQ: 7.46 INJECTION, SOLUTION INTRAVENOUS at 20:58

## 2022-12-20 RX ADMIN — SODIUM CHLORIDE, PRESERVATIVE FREE 10 ML: 5 INJECTION INTRAVENOUS at 06:45

## 2022-12-20 RX ADMIN — POTASSIUM CHLORIDE 10 MEQ: 7.46 INJECTION, SOLUTION INTRAVENOUS at 18:20

## 2022-12-20 RX ADMIN — Medication 2 UNITS: at 16:30

## 2022-12-20 RX ADMIN — FOLIC ACID: 5 INJECTION, SOLUTION INTRAMUSCULAR; INTRAVENOUS; SUBCUTANEOUS at 13:45

## 2022-12-20 RX ADMIN — POTASSIUM CHLORIDE 10 MEQ: 7.46 INJECTION, SOLUTION INTRAVENOUS at 21:56

## 2022-12-20 RX ADMIN — PANTOPRAZOLE SODIUM 40 MG: 40 INJECTION, POWDER, FOR SOLUTION INTRAVENOUS at 10:10

## 2022-12-20 RX ADMIN — SODIUM CHLORIDE, PRESERVATIVE FREE 10 ML: 5 INJECTION INTRAVENOUS at 21:00

## 2022-12-20 RX ADMIN — POTASSIUM CHLORIDE 10 MEQ: 7.46 INJECTION, SOLUTION INTRAVENOUS at 19:13

## 2022-12-20 NOTE — PROGRESS NOTES
[address]  [address] [facility], [address]       GI PROGRESS NOTE  Charleen Miller, [facility] office  [phone] NP in-hospital cell phone M-F until 4:30  After 5pm or on weekends, please call  for physician on call      NAME: Shruti Voss   :  1965   MRN:  [id number]       Subjective:   Discussed with RN - no acute events, no signs of GI Blood loss. Patient unable to answer questions. Objective:     VITALS:   Last 24hrs VS reviewed since prior progress note. Most recent are:  Visit Vitals  BP (!) 147/83 (BP 1 Location: Left upper arm, BP Patient Position: At rest)   Pulse 75   Temp 97.6 °F (36.4 °C)   Resp 12   Wt 107.4 kg (236 lb 12.4 oz)   SpO2 96%   BMI 36.00 kg/m²       PHYSICAL EXAM:  General: no acute distress    Neurologic:  drowsy  HEENT: EOMI, no scleral icterus   Lungs:  No increased WOB  Heart:  regular rate  Abdomen: Soft, non-distended, no apparent tenderness.     Extremities: warm  Psych:   MILADYS    Lab Data Reviewed:     Recent Results (from the past 24 hour(s))   AMMONIA    Collection Time: 22 10:54 AM   Result Value Ref Range    Ammonia, plasma 21 <32 UMOL/L   LACTIC ACID    Collection Time: 22 10:54 AM   Result Value Ref Range    Lactic acid 1.1 0.4 - 2.0 MMOL/L   DRUG SCREEN, URINE    Collection Time: 22 10:54 AM   Result Value Ref Range    AMPHETAMINES Negative NEG      BARBITURATES Negative NEG      BENZODIAZEPINES Negative NEG      COCAINE Negative NEG      METHADONE Negative NEG      OPIATES Negative NEG      PCP(PHENCYCLIDINE) Negative NEG      THC (TH-CANNABINOL) Negative NEG      Drug screen comment (NOTE)    URINALYSIS W/MICROSCOPIC    Collection Time: 22 10:54 AM   Result Value Ref Range    Color YELLOW/STRAW      Appearance CLEAR CLEAR      Specific gravity 1.022 1.003 - 1.030      pH (UA) 5.0 5.0 - 8.0      Protein Negative NEG mg/dL    Glucose Negative NEG mg/dL    Ketone Negative NEG mg/dL    [facility]      CARDIOLOGY PROGRESS NOTE  RECS:      1. Acute respiratory failure- requiring mechanical ventilation- extubated  12/17/20. re intubated 12/22/20. 2. Hypotension- possible septic shock- in the setting aspirations pneumonia vs UTI. - on antibiotics. Off vasopressor support       3. Subacute MI- 12/10/20 -s/p LHC S/p ptca/stent to proximal/ mid LAD using ARELY.  LVEDP 8 mmHg. Normal PASP pressure with normal PCWP. Moderate to severe LV dysfunction. ekg 12/22/20  NSR w/o  acute ischemic changes. continue to monitor. Continue to trend Troponin. Continue asa and brilinta   4. Acute Systolic Congestive heart Failure-recent echo with EF% 35%-40-better. on lasix 40 mg bid   5. COPD, on home O2 4L NC   6. Hepatitis C ? -Per family member   9. DM2- medications per medical team   8. Dementia - failed swallow eval.  Per GI note - poor candidate at this time for G-tube due to not being able to stop Brilinta. Consider IR consult  for PEG tube   9.        Continue DAPT. May need trach+peg  Prognosis poor  Can use heparin drip if DAPT is interuppted. Extubation per PCCM  Plan discussed with team         Cardiac cath 12/11/20  Patient presented to [address] [place] [address] with late presentation anterior wall MI.  EF 35-40%. Patient was initially managed medically-- however developed acute pulmonary edema requiring intubation. Also troponin level increasing consistent with ongoing ischemia. S/p ptca/stent to proximal/ mid LAD using ARELY. LVEDP 8 mmHg. Normal PASP pressure with normal PCWP. Moderate to severe LV dysfunction.     Echo 12/10/20  · LV: Estimated LVEF is 35 - 40%. Visually measured ejection fraction. Normal cavity size and wall thickness. Moderately and segmentally reduced systolic function. Inconclusive left ventricular diastolic function. · AO: Mild aortic root dilatation; diameter is 3.8 cm.   ASSESSMENT:  Hospital Problems  Date Reviewed: 2/7/2018          Codes Class Negative NEG      Blood Negative NEG      Urobilinogen 0.2 0.2 - 1.0 EU/dL    Nitrites Negative NEG      Leukocyte Esterase Negative NEG      WBC 0-4 0 - 4 /hpf    RBC 0-5 0 - 5 /hpf    Epithelial cells FEW FEW /lpf    Bacteria Negative NEG /hpf    Hyaline cast 0-2 0 - 5 /lpf   GLUCOSE, POC    Collection Time: 12/19/22 11:48 AM   Result Value Ref Range    Glucose (POC) 186 (H) 65 - 117 mg/dL    Performed by Mateo ALTAMIRANO    CBC WITH AUTOMATED DIFF    Collection Time: 12/19/22 12:16 PM   Result Value Ref Range    WBC 5.3 4.1 - 11.1 K/uL    RBC 2.14 (L) 4.10 - 5.70 M/uL    HGB 6.3 (L) 12.1 - 17.0 g/dL    HCT 20.1 (L) 36.6 - 50.3 %    MCV 93.9 80.0 - 99.0 FL    MCH 29.4 26.0 - 34.0 PG    MCHC 31.3 30.0 - 36.5 g/dL    RDW 14.1 11.5 - 14.5 %    PLATELET 784 553 - 682 K/uL    MPV 10.2 8.9 - 12.9 FL    NRBC 0.0 0  WBC    ABSOLUTE NRBC 0.00 0.00 - 0.01 K/uL    NEUTROPHILS 71 32 - 75 %    LYMPHOCYTES 23 12 - 49 %    MONOCYTES 4 (L) 5 - 13 %    EOSINOPHILS 1 0 - 7 %    BASOPHILS 0 0 - 1 %    IMMATURE GRANULOCYTES 1 (H) 0.0 - 0.5 %    ABS. NEUTROPHILS 3.7 1.8 - 8.0 K/UL    ABS. LYMPHOCYTES 1.2 0.8 - 3.5 K/UL    ABS. MONOCYTES 0.2 0.0 - 1.0 K/UL    ABS. EOSINOPHILS 0.1 0.0 - 0.4 K/UL    ABS. BASOPHILS 0.0 0.0 - 0.1 K/UL    ABS. IMM.  GRANS. 0.1 (H) 0.00 - 0.04 K/UL    DF SMEAR SCANNED      PLATELET COMMENTS Large Platelets      RBC COMMENTS POLYCHROMASIA  PRESENT        RBC COMMENTS MICROCYTOSIS  PRESENT       METABOLIC PANEL, COMPREHENSIVE    Collection Time: 12/19/22 12:16 PM   Result Value Ref Range    Sodium 140 136 - 145 mmol/L    Potassium 3.8 3.5 - 5.1 mmol/L    Chloride 111 (H) 97 - 108 mmol/L    CO2 25 21 - 32 mmol/L    Anion gap 4 (L) 5 - 15 mmol/L    Glucose 171 (H) 65 - 100 mg/dL    BUN 30 (H) 6 - 20 MG/DL    Creatinine 0.76 0.70 - 1.30 MG/DL    BUN/Creatinine ratio 39 (H) 12 - 20      eGFR >60 >60 ml/min/1.73m2    Calcium 7.6 (L) 8.5 - 10.1 MG/DL    Bilirubin, total 0.6 0.2 - 1.0 MG/DL    ALT (SGPT) 25 Noted POA    Goals of care, counseling/discussion ICD-10-CM: Z71.89  ICD-9-CM: V65.49  Unknown Unknown        Dementia without behavioral disturbance (HCC) ICD-10-CM: F03.90  ICD-9-CM: 294.20  Unknown Unknown        Pulmonary fibrosis ([facility] ICD-10-CM: J84.10  ICD-9-CM: 313  Unknown Unknown        Severe protein-calorie malnutrition ([facility] ICD-10-CM: E43  ICD-9-CM: 419  12/16/2020 Clinically Undetermined        Acute on chronic systolic congestive heart failure ([facility] ICD-10-CM: I50.23  ICD-9-CM: 428.23, 428.0  12/15/2020 Unknown        * (Principal) STEMI (ST elevation myocardial infarction) ([facility] ICD-10-CM: I21.3  ICD-9-CM: 410.90  12/10/2020 Unknown                SUBJECTIVE:  Intubated and agitated     OBJECTIVE:    VS:   Visit Vitals  /75   Pulse 100   Temp 100 °F (37.8 °C)   Resp 26   Ht 5' 7\" (1.702 m)   Wt 62.6 kg (138 lb 0.1 oz)   SpO2 93%   Breastfeeding No   BMI 21.62 kg/m²         Intake/Output Summary (Last 24 hours) at 12/24/2020 1243  Last data filed at 12/24/2020 1200  Gross per 24 hour   Intake 1295.32 ml   Output 1180 ml   Net 115.32 ml     TELE: normal sinus rhythm    General: intubated   HENT: Normocephalic, atraumatic. Normal external eye.   Neck :  no JVD  Cardiac:  regular rate and rhythm, S1, S2 normal, no murmur, click, rub or gallop  Lungs: Scattered rhonchi  Abdomen: Soft, nontender, no masses  Extremities:  No c/c/e, peripheral pulses present      Labs: Results:       Chemistry Recent Labs     12/24/20  0400 12/23/20  0330 12/22/20  1128 12/22/20  0955   * 258* 205* 205*   * 147* 149* 151*   K 3.7 3.5 3.8 4.2    107 105 107   CO2 36* 36* 36* 38*   BUN 73* 78* 73* 70*   CREA 1.26 1.30 1.36* 1.30   CA 9.5 9.6 9.9 10.3*   AGAP 5 4 8 6   BUCR 58* 60* 54* 54*   AP  --   --  122* 136*   TP  --   --  8.1 8.3*   ALB  --   --  3.3* 2.8*   GLOB  --   --  4.8* 5.5*   AGRAT  --   --  0.7* 0.5*      CBC w/Diff Recent Labs     12/24/20  0400 12/23/20  0330 12/22/20  1128   WBC 12 - 78 U/L    AST (SGOT) 20 15 - 37 U/L    Alk. phosphatase 89 45 - 117 U/L    Protein, total 5.5 (L) 6.4 - 8.2 g/dL    Albumin 2.8 (L) 3.5 - 5.0 g/dL    Globulin 2.7 2.0 - 4.0 g/dL    A-G Ratio 1.0 (L) 1.1 - 2.2     LIPID PANEL    Collection Time: 12/19/22 12:16 PM   Result Value Ref Range    Cholesterol, total 141 <200 MG/DL    Triglyceride 176 (H) <150 MG/DL    HDL Cholesterol 36 MG/DL    LDL, calculated 69.8 0 - 100 MG/DL    VLDL, calculated 35.2 MG/DL    CHOL/HDL Ratio 3.9 0.0 - 5.0     TROPONIN-HIGH SENSITIVITY    Collection Time: 12/19/22 12:16 PM   Result Value Ref Range    Troponin-High Sensitivity 9 0 - 76 ng/L   IRON PROFILE    Collection Time: 12/19/22 12:16 PM   Result Value Ref Range    Iron 65 35 - 150 ug/dL    TIBC 271 250 - 450 ug/dL    Iron % saturation 24 20 - 50 %   TSH 3RD GENERATION    Collection Time: 12/19/22 12:16 PM   Result Value Ref Range    TSH 1.08 0.36 - 3.74 uIU/mL   FOLATE    Collection Time: 12/19/22 12:16 PM   Result Value Ref Range    Folate 17.0 5.0 - 21.0 ng/mL   ACETAMINOPHEN    Collection Time: 12/19/22 12:16 PM   Result Value Ref Range    Acetaminophen level 4 (L) 10 - 30 ug/mL   SALICYLATE    Collection Time: 12/19/22 12:16 PM   Result Value Ref Range    Salicylate level <2.7 (L) 2.8 - 20.0 MG/DL   VITAMIN B12    Collection Time: 12/19/22 12:16 PM   Result Value Ref Range    Vitamin B12 227 193 - 986 pg/mL   LIPASE    Collection Time: 12/19/22 12:16 PM   Result Value Ref Range    Lipase 265 73 - 393 U/L   FERRITIN    Collection Time: 12/19/22 12:16 PM   Result Value Ref Range    Ferritin 135 26 - 388 NG/ML   RBC, ALLOCATE    Collection Time: 12/19/22  1:15 PM   Result Value Ref Range    HISTORY CHECKED?  Historical check performed    GLUCOSE, POC    Collection Time: 12/19/22  5:58 PM   Result Value Ref Range    Glucose (POC) 170 (H) 65 - 117 mg/dL    Performed by [id number] [place], POC    Collection Time: 12/19/22  6:56 PM   Result Value Ref Range    Glucose 19.4* 24.2* 31.0*   RBC 3.54* 3.96* 4.29   HGB 9.3* 10.4* 11.4*   HCT 31.1* 34.3* 38.5   * 672* 828*   GRANS 87* 73 90*   LYMPH 4* 8* 9*   EOS 0 0 0      Cardiac Enzymes Recent Labs     12/22/20  1128 12/22/20  0955   CPK 35 48   CKND1 5.4* 4.0      Coagulation Recent Labs     12/22/20  1128   PTP 14.8   INR 1.2       Lipid Panel No results found for: CHOL, CHOLPOCT, CHOLX, CHLST, CHOLV, 840531, HDL, HDLP, LDL, LDLC, DLDLP, 169554, VLDLC, VLDL, TGLX, TRIGL, TRIGP, TGLPOCT, CHHD, CHHDX   BNP No results for input(s): BNPP in the last 72 hours. Liver Enzymes Recent Labs     12/22/20  1128   TP 8.1   ALB 3.3*   *      Thyroid Studies Lab Results   Component Value Date/Time    TSH 2.92 12/10/2020 11:07 AM                  Lisa DAHL supervised    I have independently evaluated and examined the patient. All relevant labs and testing data's are reviewed. Care plan discussed and updated after review.     Domenico Muñoz MD (POC) 167 (H) 65 - 117 mg/dL    Performed by Debbie 4, POC    Collection Time: 12/19/22  9:39 PM   Result Value Ref Range    Glucose (POC) 146 (H) 65 - 117 mg/dL    Performed by Jone Avila    HEMOGLOBIN A1C WITH EAG    Collection Time: 12/20/22 12:12 AM   Result Value Ref Range    Hemoglobin A1c 8.6 (H) 4.0 - 5.6 %    Est. average glucose 200 mg/dL   CBC W/O DIFF    Collection Time: 12/20/22 12:12 AM   Result Value Ref Range    WBC 5.5 4.1 - 11.1 K/uL    RBC 2.35 (L) 4.10 - 5.70 M/uL    HGB 7.0 (L) 12.1 - 17.0 g/dL    HCT 22.1 (L) 36.6 - 50.3 %    MCV 94.0 80.0 - 99.0 FL    MCH 29.8 26.0 - 34.0 PG    MCHC 31.7 30.0 - 36.5 g/dL    RDW 14.1 11.5 - 14.5 %    PLATELET 858 497 - 892 K/uL    MPV 10.4 8.9 - 12.9 FL    NRBC 0.4 (H) 0  WBC    ABSOLUTE NRBC 0.02 (H) 0.00 - 8.26 K/uL   METABOLIC PANEL, BASIC    Collection Time: 12/20/22 12:12 AM   Result Value Ref Range    Sodium 141 136 - 145 mmol/L    Potassium 3.4 (L) 3.5 - 5.1 mmol/L    Chloride 112 (H) 97 - 108 mmol/L    CO2 25 21 - 32 mmol/L    Anion gap 4 (L) 5 - 15 mmol/L    Glucose 115 (H) 65 - 100 mg/dL    BUN 19 6 - 20 MG/DL    Creatinine 0.64 (L) 0.70 - 1.30 MG/DL    BUN/Creatinine ratio 30 (H) 12 - 20      eGFR >60 >60 ml/min/1.73m2    Calcium 8.1 (L) 8.5 - 10.1 MG/DL   MAGNESIUM    Collection Time: 12/20/22 12:12 AM   Result Value Ref Range    Magnesium 2.5 (H) 1.6 - 2.4 mg/dL   PHOSPHORUS    Collection Time: 12/20/22 12:12 AM   Result Value Ref Range    Phosphorus 2.8 2.6 - 4.7 MG/DL   GLUCOSE, POC    Collection Time: 12/20/22  8:59 AM   Result Value Ref Range    Glucose (POC) 124 (H) 65 - 117 mg/dL    Performed by Radha ALTAMIRANO             Assessment:     Anemia with hemoccult positive stool: Hgb 6.3-->7 status post 1 unit pRBC's. CT abdomen/pelvis without contrast (12/18/22): no acute process; hepatic steatosis.    Altered mental status   Reported alcohol abuse  Diabetes mellitus  Elevated D-dimer: CTA chest pending Patient Active Problem List   Diagnosis Code    Acute GI bleeding K92.2     Plan:     BID PPI  Monitor CBC  Consider EGD if mental status improves or signs of GI blood loss     Signed By: Danyelle Wharton NP     12/20/2022  10:22 AM     Agree with above  Will follow and decide on timing of EGD

## 2022-12-20 NOTE — PROGRESS NOTES
[id number]  Bedside report given to Devona Holter, RN by Elias Angela RN. Report included SBAR, ED Report, Labs, and Cardiac Rhythm NSR. Patient in bed resting well. Vitals are stable.

## 2022-12-20 NOTE — PROGRESS NOTES
Provided pastoral care visit to [facility] patient. Did not include sacramental care.      Sukhwinder Shepard

## 2022-12-20 NOTE — PROGRESS NOTES
2000 Report received from [facility]. Patient alert, resting in bed but drowsy. Call bell left within reach and call bell active. 9220 Bedside shift change report given to Hannah Snyder by Claire Saravia RN. Report included the following information SBAR, Kardex, MAR, Accordion, and Recent Results and Cardiac Rhythm NSR. Problem: Falls - Risk of  Goal: *Absence of Falls  Description: Document [place] Embs Fall Risk and appropriate interventions in the flowsheet.   Outcome: Progressing Towards Goal  Note: Fall Risk Interventions:  Mobility Interventions: Communicate number of staff needed for ambulation/transfer, Utilize walker, cane, or other assistive device    Mentation Interventions: Adequate sleep, hydration, pain control, Reorient patient, Room close to nurse's station    Medication Interventions: Evaluate medications/consider consulting pharmacy, Patient to call before getting OOB    Elimination Interventions: Call light in reach, Toilet paper/wipes in reach, Toileting schedule/hourly rounds    Problem: Alcohol Withdrawal  Goal: *STG: Participates in treatment plan  Outcome: Progressing Towards Goal     Problem: Upper and Lower GI Bleed: Day 1  Goal: Diagnostic Test/Procedures  Outcome: Progressing Towards Goal

## 2022-12-20 NOTE — PROGRESS NOTES
Problem: Falls - Risk of  Goal: *Absence of Falls  Description: Document Jo Ann Cannon Fall Risk and appropriate interventions in the flowsheet.   Outcome: Progressing Towards Goal  Note: Fall Risk Interventions:  Mobility Interventions: Bed/chair exit alarm, Patient to call before getting OOB    Mentation Interventions: Adequate sleep, hydration, pain control, Evaluate medications/consider consulting pharmacy    Medication Interventions: Assess postural VS orthostatic hypotension, Bed/chair exit alarm, Evaluate medications/consider consulting pharmacy, Patient to call before getting OOB, Teach patient to arise slowly    Elimination Interventions: Call light in reach, Patient to call for help with toileting needs, Bed/chair exit alarm              Problem: Patient Education: Go to Patient Education Activity  Goal: Patient/Family Education  Outcome: Progressing Towards Goal     Problem: Alcohol Withdrawal  Goal: *STG: Participates in treatment plan  Outcome: Progressing Towards Goal  Goal: *STG: Remains safe in hospital  Outcome: Progressing Towards Goal  Goal: *STG: Seeks staff when symptoms of withdrawal increase  Outcome: Progressing Towards Goal  Goal: *STG: Complies with medication therapy  Outcome: Progressing Towards Goal  Goal: *STG: Attends activities and groups  Outcome: Progressing Towards Goal  Goal: *STG: Will identify negative impact of chemical dependency including the use of tobacco, alcohol, and other substances  Outcome: Progressing Towards Goal  Goal: *STG: Verbalizes abstinence as an achievable goal  Outcome: Progressing Towards Goal  Goal: *STG: Agrees to participate in outpatient after care program to support ongoing mental health  Outcome: Progressing Towards Goal  Goal: *STG: Able to indentify relapse triggers including interpersonal/social and familial factors  Outcome: Progressing Towards Goal  Goal: *STG: Identify lifestyle changes to support long term sobriety such as vocation, employment, education, and legal issues  Outcome: Progressing Towards Goal  Goal: *STG: Maintains appropriate nutrition and hydration  Outcome: Progressing Towards Goal  Goal: *STG: Vital signs within defined limits  Outcome: Progressing Towards Goal  Goal: *STG/LTG: Relapse prevention plan in place to include housing/aftercare, leisure activities, and spirituality  Outcome: Progressing Towards Goal  Goal: Interventions  Outcome: Progressing Towards Goal     Problem: Patient Education: Go to Patient Education Activity  Goal: Patient/Family Education  Outcome: Progressing Towards Goal

## 2022-12-20 NOTE — PROGRESS NOTES
[facility] Adult  Hospitalist Group                                                                                          Hospitalist Progress Note  Karen Rodriguez MD  Answering service: [phone] from in house phone        Date of Service:  2022  NAME:  Wilmer Alarcon  :  1965  MRN:  [id number]      Admission Summary: This 73-year-old man with past medical history significant for type 2 diabetes presented at the [facility] emergency room with weakness and fatigue. The patient was initially seen and discharged from the emergency room. The patient came back for the second time because of the worsening weakness and fatigue. The weakness was described as generalized nonfocal weakness. [facility], the patient had a drop in his hemoglobin by one gram and he was also guaiac positive. The patient has history of significant alcohol abuse. The patient was then sent to [facility] emergency room to be evaluated for admission. The patient has no record of prior admission to this hospital.  When the patient arrived at the emergency room, there was a further drop in the patient's hemoglobin from 8.6 to 7.7. The patient was referred to the hospitalist service for admission. No associated abdominal pain. The patient denies past history of GI bleed. The patient appears a little bit confused and unable to provide good history. It is not clear whether the patient has had colonoscopy done in the past.       Interval history / Subjective: Follow up GI bleed   Drowsy but arousable, oriented times three  No chest pain, SOB    Assessment & Plan:     Acute GI bleed, lower vs upper  Acute blood loss anemia  -Protonix BID  -s/p 2 units PRBC   -Appreciate discussion with GI, will decide in am about EGD    Alcohol withdrawal delirium: on CIWA protocol, don't see signs of withdrawal for now.  Will hold scheduled Librium    Hypokalemia: replace as needed  Type 2 diabetes, ?new onset: on SSI. Will discharge the patient on Metformin  Hyponatremia hypovolemic, now resolved  Acute kidney injury, pre-renal, now resolved  Sinus tachycardia: now resolved  GGO on CT chest: outpatient follow up    Clears       Code status: FULL CODE  Prophylaxis: scd  PTA: home  Baseline: Independent    Plan: continue protonix, follow GI. NPO from midnight for a possible EGD  Care Plan discussed with: Patient  Anticipated Disposition: home likely tomorrow or day after     Hospital Problems  Date Reviewed: 12/19/2022            Codes Class Noted POA    * (Principal) Acute GI bleeding ICD-10-CM: K92.2  ICD-9-CM: 578.9  12/19/2022 Yes             Review of Systems:   A comprehensive review of systems was negative except for that written in the HPI. Vital Signs:    Last 24hrs VS reviewed since prior progress note. Most recent are:  Visit Vitals  /78 (BP 1 Location: Left upper arm, BP Patient Position: At rest)   Pulse 84   Temp 97.6 °F (36.4 °C)   Resp 19   Wt 107.4 kg (236 lb 12.4 oz)   SpO2 98%   BMI 36.00 kg/m²         Intake/Output Summary (Last 24 hours) at 12/20/2022 1709  Last data filed at 12/19/2022 2304  Gross per 24 hour   Intake 1375 ml   Output 1350 ml   Net 25 ml        Physical Examination:     I had a face to face encounter with this patient and independently examined them on 12/20/2022 as outlined below:          Constitutional:  No acute distress   ENT:  Oral mucosa moist, oropharynx benign. Resp:  CTA bilaterally. No wheezing/rhonchi/rales. No accessory muscle use. CV:  Regular rhythm, normal rate, no murmurs, gallops, rubs    GI:  Soft, non distended, non tender. normoactive bowel sounds, no hepatosplenomegaly     Musculoskeletal:  No edema, warm, 2+ pulses throughout    Neurologic:  Moves all extremities.   AAOx3, CN II-XII reviewed, drowsy but arousable            Data Review:    Review and/or order of clinical lab test      Labs:     Recent Labs     12/20/22 0012 12/19/22  1216   WBC 5.5 5.3   HGB 7.0* 6.3*   HCT 22.1* 20.1*    200     Recent Labs     12/20/22  0012 12/19/22 1216 12/18/22 2140 12/17/22 2129    140 132* 136   K 3.4* 3.8 4.4 5.8*   * 111* 102 103   CO2 25 25 23 23   BUN 19 30* 50* 46*   CREA 0.64* 0.76 1.31* 1.26   * 171* 275* 325*   CA 8.1* 7.6* 8.3* 8.3*   MG 2.5*  --   --  1.3*   PHOS 2.8  --   --   --      Recent Labs     12/19/22 1216 12/18/22 2140 12/17/22 2129   ALT 25 29 31   AP 89 114 123*   TBILI 0.6 0.7 0.7   TP 5.5* 7.0 6.8   ALB 2.8* 3.4* 3.2*   GLOB 2.7 3.6 3.6   LPSE 265  --   --      No results for input(s): INR, PTP, APTT, INREXT in the last 72 hours. Recent Labs     12/19/22  1216   TIBC 271   PSAT 24   FERR 135      Lab Results   Component Value Date/Time    Folate 17.0 12/19/2022 12:16 PM      No results for input(s): PH, PCO2, PO2 in the last 72 hours. No results for input(s): CPK, CKNDX, TROIQ in the last 72 hours.     No lab exists for component: CPKMB  Lab Results   Component Value Date/Time    Cholesterol, total 141 12/19/2022 12:16 PM    HDL Cholesterol 36 12/19/2022 12:16 PM    LDL, calculated 69.8 12/19/2022 12:16 PM    Triglyceride 176 (H) 12/19/2022 12:16 PM    CHOL/HDL Ratio 3.9 12/19/2022 12:16 PM     Lab Results   Component Value Date/Time    Glucose (POC) 121 (H) 12/20/2022 12:23 PM    Glucose (POC) 124 (H) 12/20/2022 08:59 AM    Glucose (POC) 146 (H) 12/19/2022 09:39 PM    Glucose (POC) 167 (H) 12/19/2022 06:56 PM    Glucose (POC) 170 (H) 12/19/2022 05:58 PM     Lab Results   Component Value Date/Time    Color YELLOW/STRAW 12/19/2022 10:54 AM    Appearance CLEAR 12/19/2022 10:54 AM    Specific gravity 1.022 12/19/2022 10:54 AM    pH (UA) 5.0 12/19/2022 10:54 AM    Protein Negative 12/19/2022 10:54 AM    Glucose Negative 12/19/2022 10:54 AM    Ketone Negative 12/19/2022 10:54 AM    Bilirubin Negative 12/19/2022 10:54 AM Urobilinogen 0.2 12/19/2022 10:54 AM    Nitrites Negative 12/19/2022 10:54 AM    Leukocyte Esterase Negative 12/19/2022 10:54 AM    Epithelial cells FEW 12/19/2022 10:54 AM    Bacteria Negative 12/19/2022 10:54 AM    WBC 0-4 12/19/2022 10:54 AM    RBC 0-5 12/19/2022 10:54 AM         Medications Reviewed:     Current Facility-Administered Medications   Medication Dose Route Frequency    sodium chloride (NS) flush 5-40 mL  5-40 mL IntraVENous Q8H    sodium chloride (NS) flush 5-40 mL  5-40 mL IntraVENous PRN    acetaminophen (TYLENOL) tablet 650 mg  650 mg Oral Q6H PRN    Or    acetaminophen (TYLENOL) suppository 650 mg  650 mg Rectal Q6H PRN    polyethylene glycol (MIRALAX) packet 17 g  17 g Oral DAILY PRN    ondansetron (ZOFRAN ODT) tablet 4 mg  4 mg Oral Q8H PRN    Or    ondansetron (ZOFRAN) injection 4 mg  4 mg IntraVENous Q6H PRN    insulin lispro (HUMALOG) injection   SubCUTAneous AC&HS    glucose chewable tablet 16 g  4 Tablet Oral PRN    glucagon (GLUCAGEN) injection 1 mg  1 mg IntraMUSCular PRN    pantoprazole (PROTONIX) 40 mg in 0.9% sodium chloride 10 mL injection  40 mg IntraVENous Q12H    dextrose 10 % infusion 0-250 mL  0-250 mL IntraVENous PRN    lactated Ringers 0,473 mL with folic acid 1 mg, thiamine 100 mg, mvi (adult no. 4 with vit K) 10 mL infusion   IntraVENous DAILY    diazePAM (VALIUM) injection 5 mg  5 mg IntraVENous Q4H PRN    0.9% sodium chloride infusion 250 mL  250 mL IntraVENous PRN     ______________________________________________________________________  EXPECTED LENGTH OF STAY: - - -  ACTUAL LENGTH OF STAY:          Elena Escamilla MD

## 2022-12-21 ENCOUNTER — ANESTHESIA EVENT (OUTPATIENT)
Dept: ENDOSCOPY | Age: 57
End: 2022-12-21

## 2022-12-21 ENCOUNTER — ANESTHESIA (OUTPATIENT)
Dept: ENDOSCOPY | Age: 57
End: 2022-12-21

## 2022-12-21 LAB
ALBUMIN SERPL-MCNC: 2.8 G/DL (ref 3.5–5)
ALBUMIN/GLOB SERPL: 0.8 {RATIO} (ref 1.1–2.2)
ALP SERPL-CCNC: 103 U/L (ref 45–117)
ALT SERPL-CCNC: 29 U/L (ref 12–78)
ANION GAP SERPL CALC-SCNC: 6 MMOL/L (ref 5–15)
AST SERPL-CCNC: 23 U/L (ref 15–37)
BASOPHILS # BLD: 0 K/UL (ref 0–0.1)
BASOPHILS NFR BLD: 1 % (ref 0–1)
BILIRUB SERPL-MCNC: 0.8 MG/DL (ref 0.2–1)
BUN SERPL-MCNC: 11 MG/DL (ref 6–20)
BUN/CREAT SERPL: 19 (ref 12–20)
CALCIUM SERPL-MCNC: 8.4 MG/DL (ref 8.5–10.1)
CHLORIDE SERPL-SCNC: 108 MMOL/L (ref 97–108)
CO2 SERPL-SCNC: 24 MMOL/L (ref 21–32)
CREAT SERPL-MCNC: 0.59 MG/DL (ref 0.7–1.3)
DIFFERENTIAL METHOD BLD: ABNORMAL
EOSINOPHIL # BLD: 0.2 K/UL (ref 0–0.4)
EOSINOPHIL NFR BLD: 4 % (ref 0–7)
ERYTHROCYTE [DISTWIDTH] IN BLOOD BY AUTOMATED COUNT: 14.1 % (ref 11.5–14.5)
GLOBULIN SER CALC-MCNC: 3.3 G/DL (ref 2–4)
GLUCOSE BLD STRIP.AUTO-MCNC: 105 MG/DL (ref 65–117)
GLUCOSE BLD STRIP.AUTO-MCNC: 113 MG/DL (ref 65–117)
GLUCOSE BLD STRIP.AUTO-MCNC: 195 MG/DL (ref 65–117)
GLUCOSE SERPL-MCNC: 98 MG/DL (ref 65–100)
HCT VFR BLD AUTO: 22.3 % (ref 36.6–50.3)
HCT VFR BLD AUTO: 22.6 % (ref 36.6–50.3)
HGB BLD-MCNC: 7.5 G/DL (ref 12.1–17)
HGB BLD-MCNC: 7.6 G/DL (ref 12.1–17)
IMM GRANULOCYTES # BLD AUTO: 0.1 K/UL (ref 0–0.04)
IMM GRANULOCYTES NFR BLD AUTO: 1 % (ref 0–0.5)
LYMPHOCYTES # BLD: 1.6 K/UL (ref 0.8–3.5)
LYMPHOCYTES NFR BLD: 28 % (ref 12–49)
MCH RBC QN AUTO: 29.9 PG (ref 26–34)
MCHC RBC AUTO-ENTMCNC: 33.2 G/DL (ref 30–36.5)
MCV RBC AUTO: 90 FL (ref 80–99)
MONOCYTES # BLD: 0.3 K/UL (ref 0–1)
MONOCYTES NFR BLD: 6 % (ref 5–13)
NEUTS SEG # BLD: 3.3 K/UL (ref 1.8–8)
NEUTS SEG NFR BLD: 60 % (ref 32–75)
NRBC # BLD: 0 K/UL (ref 0–0.01)
NRBC BLD-RTO: 0 PER 100 WBC
PLATELET # BLD AUTO: 199 K/UL (ref 150–400)
PMV BLD AUTO: 10.5 FL (ref 8.9–12.9)
POTASSIUM SERPL-SCNC: 3.4 MMOL/L (ref 3.5–5.1)
PROT SERPL-MCNC: 6.1 G/DL (ref 6.4–8.2)
RBC # BLD AUTO: 2.51 M/UL (ref 4.1–5.7)
SERVICE CMNT-IMP: ABNORMAL
SERVICE CMNT-IMP: NORMAL
SERVICE CMNT-IMP: NORMAL
SODIUM SERPL-SCNC: 138 MMOL/L (ref 136–145)
WBC # BLD AUTO: 5.5 K/UL (ref 4.1–11.1)

## 2022-12-21 PROCEDURE — 97535 SELF CARE MNGMENT TRAINING: CPT

## 2022-12-21 PROCEDURE — 85018 HEMOGLOBIN: CPT

## 2022-12-21 PROCEDURE — 65660000001 HC RM ICU INTERMED STEPDOWN

## 2022-12-21 PROCEDURE — 74011250636 HC RX REV CODE- 250/636: Performed by: INTERNAL MEDICINE

## 2022-12-21 PROCEDURE — 85025 COMPLETE CBC W/AUTO DIFF WBC: CPT

## 2022-12-21 PROCEDURE — 80053 COMPREHEN METABOLIC PANEL: CPT

## 2022-12-21 PROCEDURE — 76060000031 HC ANESTHESIA FIRST 0.5 HR: Performed by: INTERNAL MEDICINE

## 2022-12-21 PROCEDURE — 30233N1 TRANSFUSION OF NONAUTOLOGOUS RED BLOOD CELLS INTO PERIPHERAL VEIN, PERCUTANEOUS APPROACH: ICD-10-PCS | Performed by: INTERNAL MEDICINE

## 2022-12-21 PROCEDURE — 97165 OT EVAL LOW COMPLEX 30 MIN: CPT

## 2022-12-21 PROCEDURE — 76040000019: Performed by: INTERNAL MEDICINE

## 2022-12-21 PROCEDURE — 74011250636 HC RX REV CODE- 250/636: Performed by: NURSE ANESTHETIST, CERTIFIED REGISTERED

## 2022-12-21 PROCEDURE — 88342 IMHCHEM/IMCYTCHM 1ST ANTB: CPT

## 2022-12-21 PROCEDURE — 0DB68ZX EXCISION OF STOMACH, VIA NATURAL OR ARTIFICIAL OPENING ENDOSCOPIC, DIAGNOSTIC: ICD-10-PCS | Performed by: INTERNAL MEDICINE

## 2022-12-21 PROCEDURE — C9113 INJ PANTOPRAZOLE SODIUM, VIA: HCPCS | Performed by: INTERNAL MEDICINE

## 2022-12-21 PROCEDURE — 82962 GLUCOSE BLOOD TEST: CPT

## 2022-12-21 PROCEDURE — 2709999900 HC NON-CHARGEABLE SUPPLY: Performed by: INTERNAL MEDICINE

## 2022-12-21 PROCEDURE — 74011000250 HC RX REV CODE- 250: Performed by: INTERNAL MEDICINE

## 2022-12-21 PROCEDURE — 36415 COLL VENOUS BLD VENIPUNCTURE: CPT

## 2022-12-21 PROCEDURE — 74011000250 HC RX REV CODE- 250: Performed by: NURSE ANESTHETIST, CERTIFIED REGISTERED

## 2022-12-21 PROCEDURE — 88305 TISSUE EXAM BY PATHOLOGIST: CPT

## 2022-12-21 PROCEDURE — 77030021593 HC FCPS BIOP ENDOSC BSC -A: Performed by: INTERNAL MEDICINE

## 2022-12-21 RX ORDER — PANTOPRAZOLE SODIUM 40 MG/1
40 TABLET, DELAYED RELEASE ORAL 2 TIMES DAILY
Qty: 60 TABLET | Refills: 0 | Status: SHIPPED | OUTPATIENT
Start: 2022-12-21

## 2022-12-21 RX ORDER — FOLIC ACID 1 MG/1
1 TABLET ORAL DAILY
Qty: 30 TABLET | Refills: 2 | Status: SHIPPED | OUTPATIENT
Start: 2022-12-21

## 2022-12-21 RX ORDER — FLUMAZENIL 0.1 MG/ML
0.2 INJECTION INTRAVENOUS
Status: DISCONTINUED | OUTPATIENT
Start: 2022-12-21 | End: 2022-12-21 | Stop reason: HOSPADM

## 2022-12-21 RX ORDER — THERA TABS 400 MCG
1 TAB ORAL DAILY
Qty: 30 TABLET | Refills: 2 | Status: SHIPPED | OUTPATIENT
Start: 2022-12-21

## 2022-12-21 RX ORDER — PROPOFOL 10 MG/ML
INJECTION, EMULSION INTRAVENOUS AS NEEDED
Status: DISCONTINUED | OUTPATIENT
Start: 2022-12-21 | End: 2022-12-21 | Stop reason: HOSPADM

## 2022-12-21 RX ORDER — SODIUM CHLORIDE 0.9 % (FLUSH) 0.9 %
5-40 SYRINGE (ML) INJECTION AS NEEDED
Status: DISCONTINUED | OUTPATIENT
Start: 2022-12-21 | End: 2022-12-23 | Stop reason: HOSPADM

## 2022-12-21 RX ORDER — ATROPINE SULFATE 0.1 MG/ML
0.5 INJECTION INTRAVENOUS
Status: DISCONTINUED | OUTPATIENT
Start: 2022-12-21 | End: 2022-12-21 | Stop reason: HOSPADM

## 2022-12-21 RX ORDER — SODIUM CHLORIDE 9 MG/ML
50 INJECTION, SOLUTION INTRAVENOUS CONTINUOUS
Status: DISPENSED | OUTPATIENT
Start: 2022-12-21 | End: 2022-12-21

## 2022-12-21 RX ORDER — SODIUM CHLORIDE 0.9 % (FLUSH) 0.9 %
5-40 SYRINGE (ML) INJECTION EVERY 8 HOURS
Status: DISCONTINUED | OUTPATIENT
Start: 2022-12-21 | End: 2022-12-23 | Stop reason: HOSPADM

## 2022-12-21 RX ORDER — LANOLIN ALCOHOL/MO/W.PET/CERES
100 CREAM (GRAM) TOPICAL DAILY
Qty: 30 TABLET | Refills: 2 | Status: SHIPPED | OUTPATIENT
Start: 2022-12-21

## 2022-12-21 RX ORDER — LIDOCAINE HYDROCHLORIDE 20 MG/ML
INJECTION, SOLUTION EPIDURAL; INFILTRATION; INTRACAUDAL; PERINEURAL AS NEEDED
Status: DISCONTINUED | OUTPATIENT
Start: 2022-12-21 | End: 2022-12-21 | Stop reason: HOSPADM

## 2022-12-21 RX ORDER — MIDAZOLAM HYDROCHLORIDE 1 MG/ML
.25-5 INJECTION, SOLUTION INTRAMUSCULAR; INTRAVENOUS
Status: DISCONTINUED | OUTPATIENT
Start: 2022-12-21 | End: 2022-12-21 | Stop reason: HOSPADM

## 2022-12-21 RX ORDER — DEXTROMETHORPHAN/PSEUDOEPHED 2.5-7.5/.8
1.2 DROPS ORAL
Status: DISCONTINUED | OUTPATIENT
Start: 2022-12-21 | End: 2022-12-21 | Stop reason: HOSPADM

## 2022-12-21 RX ORDER — NALOXONE HYDROCHLORIDE 0.4 MG/ML
0.4 INJECTION, SOLUTION INTRAMUSCULAR; INTRAVENOUS; SUBCUTANEOUS
Status: DISCONTINUED | OUTPATIENT
Start: 2022-12-21 | End: 2022-12-21 | Stop reason: HOSPADM

## 2022-12-21 RX ORDER — EPINEPHRINE 0.1 MG/ML
1 INJECTION INTRACARDIAC; INTRAVENOUS
Status: DISCONTINUED | OUTPATIENT
Start: 2022-12-21 | End: 2022-12-21 | Stop reason: HOSPADM

## 2022-12-21 RX ORDER — FENTANYL CITRATE 50 UG/ML
25-200 INJECTION, SOLUTION INTRAMUSCULAR; INTRAVENOUS
Status: DISCONTINUED | OUTPATIENT
Start: 2022-12-21 | End: 2022-12-21 | Stop reason: HOSPADM

## 2022-12-21 RX ADMIN — LIDOCAINE HYDROCHLORIDE 60 MG: 20 INJECTION, SOLUTION EPIDURAL; INFILTRATION; INTRACAUDAL; PERINEURAL at 17:26

## 2022-12-21 RX ADMIN — FOLIC ACID: 5 INJECTION, SOLUTION INTRAMUSCULAR; INTRAVENOUS; SUBCUTANEOUS at 10:16

## 2022-12-21 RX ADMIN — FOLIC ACID 100 ML: 5 INJECTION, SOLUTION INTRAMUSCULAR; INTRAVENOUS; SUBCUTANEOUS at 17:30

## 2022-12-21 RX ADMIN — PROPOFOL 80 MG: 10 INJECTION, EMULSION INTRAVENOUS at 17:22

## 2022-12-21 RX ADMIN — SODIUM CHLORIDE, PRESERVATIVE FREE 10 ML: 5 INJECTION INTRAVENOUS at 13:05

## 2022-12-21 RX ADMIN — PANTOPRAZOLE SODIUM 40 MG: 40 INJECTION, POWDER, FOR SOLUTION INTRAVENOUS at 10:16

## 2022-12-21 RX ADMIN — PROPOFOL 80 MG: 10 INJECTION, EMULSION INTRAVENOUS at 17:26

## 2022-12-21 NOTE — PROGRESS NOTES
Problem: Falls - Risk of  Goal: *Absence of Falls  Description: Document Lettie Duverney Fall Risk and appropriate interventions in the flowsheet.   Outcome: Progressing Towards Goal  Note: Fall Risk Interventions:  Mobility Interventions: Bed/chair exit alarm, Communicate number of staff needed for ambulation/transfer, Patient to call before getting OOB    Mentation Interventions: Adequate sleep, hydration, pain control, Bed/chair exit alarm, Evaluate medications/consider consulting pharmacy    Medication Interventions: Bed/chair exit alarm, Evaluate medications/consider consulting pharmacy, Patient to call before getting OOB    Elimination Interventions: Bed/chair exit alarm, Call light in reach, Patient to call for help with toileting needs              Problem: Alcohol Withdrawal  Goal: *STG: Complies with medication therapy  Outcome: Progressing Towards Goal

## 2022-12-21 NOTE — PROCEDURES
[address]  [address] [facility], [address] (EGD) Procedure Note    Perico Links  1965  [id number]      Procedure: Endoscopic Gastroduodenoscopy with biopsy    Indication:  Hematemesis     Pre-operative Diagnosis: see indication above    Post-operative Diagnosis: see findings below    : Neel Guzmán MD    Surgical Assistant: Endoscopy Technician-1: Alex Boss  Endoscopy RN-1: Sandra Sapp RN    Implants:  None    Referring Provider:  None      Anesthesia/Sedation:  MAC anesthesia Propofol        Procedure Details     After infomed consent was obtained for the procedure, with all risks and benefits of procedure explained the patient was taken to the endoscopy suite and placed in the left lateral decubitus position. Following sequential administration of sedation as per above, the endoscope was inserted into the mouth and advanced under direct vision to third portion of the duodenum. A careful inspection was made as the gastroscope was withdrawn, including a retroflexed view of the proximal stomach; findings and interventions are described below. Findings:   Esophagus:normal  Stomach: gastritis in antrum, 10 small ulcers in antrum, with clear bases, non bleeding, largest one around 1 cm in size, biopsies taken   Duodenum:  duodenitis in bulb  There was around 1 x 1.5 cm ulcer in apex of the duodenal bulb, non bleeding, with clear base  Rest of duodenum was normal      Therapies:  none    Specimens: as above         EBL: None      Complications:   None; patient tolerated the procedure well. Impression:    -See post-procedure diagnoses.     Recommendations:  -Continue acid suppression with protonix for 6 months  -avoid NSAIDS and ETOH  -will advance diet  -discharge per hospitalist  -follow up with his GI provider or our GI clinic in 4 to 6 weeks  -will follow as needed    Signed By: Neel Guzmán MD 12/21/2022  5:33 PM

## 2022-12-21 NOTE — DISCHARGE INSTRUCTIONS
Discharge Instructions       PATIENT ID: Nasir Gamez  MRN: [id number]   YOB: 1965    DATE OF ADMISSION: 12/19/2022  DATE OF DISCHARGE: 12/22/2022    PRIMARY CARE PROVIDER: @PCP@     ATTENDING PHYSICIAN: Anuja Charles MD   DISCHARGING PROVIDER: Anuja Charles MD    To contact this individual call [phone] and ask the  to page. If unavailable ask to be transferred the Adult Hospitalist Department. DISCHARGE DIAGNOSES   GI bleed,   Alcohol withdrawal    CONSULTATIONS: GI    PROCEDURES/SURGERIES: * No surgery found *    PENDING TEST RESULTS:   At the time of discharge the following test results are still pending: none    FOLLOW UP APPOINTMENTS:   PCP  GI    ADDITIONAL CARE RECOMMENDATIONS:   Repeat CT chest in 4 weeks  Protonix for 6 months    DIET: Cardiac Diet    ACTIVITY: Activity as tolerated    DISCHARGE MEDICATIONS:   See Medication Reconciliation Form    It is important that you take the medication exactly as they are prescribed. Keep your medication in the bottles provided by the pharmacist and keep a list of the medication names, dosages, and times to be taken in your wallet. Do not take other medications without consulting your doctor. NOTIFY YOUR PHYSICIAN FOR ANY OF THE FOLLOWING:   Fever over 101 degrees for 24 hours. Chest pain, shortness of breath, fever, chills, nausea, vomiting, diarrhea, change in mentation, falling, weakness, bleeding. Severe pain or pain not relieved by medications. Or, any other signs or symptoms that you may have questions about.       DISPOSITION:   x Home With:   OT  PT  HH  RN       SNF/Inpatient Rehab/LTAC    Independent/assisted living    Hospice    Other:     CDMP Checked:   Yes x     PROBLEM LIST Updated:  Yes x       Signed:   Anuja Charles MD  12/22/2022  10:53 AM

## 2022-12-21 NOTE — ANESTHESIA POSTPROCEDURE EVALUATION
Procedure(s):  ESOPHAGOGASTRODUODENOSCOPY (EGD)  ESOPHAGOGASTRODUODENAL (EGD) BIOPSY. MAC    Anesthesia Post Evaluation      Multimodal analgesia: multimodal analgesia used between 6 hours prior to anesthesia start to PACU discharge  Patient location during evaluation: PACU  Level of consciousness: awake  Pain management: adequate  Airway patency: patent  Anesthetic complications: no  Cardiovascular status: acceptable  Respiratory status: acceptable  Hydration status: acceptable  Post anesthesia nausea and vomiting:  none  Final Post Anesthesia Temperature Assessment:  Normothermia (36.0-37.5 degrees C)      INITIAL Post-op Vital signs:   Vitals Value Taken Time   /92 12/21/22 1737   Temp     Pulse 86 12/21/22 1739   Resp 24 12/21/22 1739   SpO2 98 % 12/21/22 1739   Vitals shown include unvalidated device data.

## 2022-12-21 NOTE — PROGRESS NOTES
[facility] Adult  Hospitalist Group                                                                                          Hospitalist Progress Note  Mona Renteria MD  Answering service: [phone] from in house phone        Date of Service:  2022  NAME:  Yarelis Alberts  :  1965  MRN:  [id number]      Admission Summary: This 78-year-old man with past medical history significant for type 2 diabetes presented at the [facility] emergency room with weakness and fatigue. The patient was initially seen and discharged from the emergency room. The patient came back for the second time because of the worsening weakness and fatigue. The weakness was described as generalized nonfocal weakness. [facility], the patient had a drop in his hemoglobin by one gram and he was also guaiac positive. The patient has history of significant alcohol abuse. The patient was then sent to [facility] emergency room to be evaluated for admission. The patient has no record of prior admission to this hospital.  When the patient arrived at the emergency room, there was a further drop in the patient's hemoglobin from 8.6 to 7.7. The patient was referred to the hospitalist service for admission. No associated abdominal pain. The patient denies past history of GI bleed. The patient appears a little bit confused and unable to provide good history. It is not clear whether the patient has had colonoscopy done in the past.       Interval history / Subjective:    Follow up GI bleed   Drowsy but arousable, oriented times three  No chest pain, SOB  Hb remains stable    Assessment & Plan:     Acute GI bleed, lower vs upper  Acute blood loss anemia  -Protonix BID  -s/p 2 units PRBC   -GI to decide about intervention, if no intervention, will discharge the patient home  -Appreciate GI    Alcohol withdrawal delirium: no signs of withdrawal  Hypokalemia: replace as needed  Type 2 diabetes, new onset: on SSI. Will discharge the patient on Metformin  Hyponatremia hypovolemic, now resolved  Acute kidney injury, pre-renal, now resolved  Sinus tachycardia: now resolved  GGO on CT chest: outpatient follow up  ? PK, says he has been tested but does not have PK    NPO for now       Code status: FULL CODE  Prophylaxis: scd  PTA: home  Baseline: Independent    Plan: Awaiting GI rec, if no intervention today, will discharge the patient. Care Plan discussed with: Patient  Anticipated Disposition: home likely today if no GI intervention     Hospital Problems  Date Reviewed: 12/19/2022            Codes Class Noted POA    * (Principal) Acute GI bleeding ICD-10-CM: K92.2  ICD-9-CM: 578.9  12/19/2022 Yes           Review of Systems:   A comprehensive review of systems was negative except for that written in the HPI. Vital Signs:    Last 24hrs VS reviewed since prior progress note. Most recent are:  Visit Vitals  /80   Pulse 68   Temp 97.6 °F (36.4 °C)   Resp 12   Wt 107.2 kg (236 lb 5.3 oz)   SpO2 97%   BMI 35.93 kg/m²         Intake/Output Summary (Last 24 hours) at 12/21/2022 1046  Last data filed at 12/21/2022 8909  Gross per 24 hour   Intake 640 ml   Output 1850 ml   Net -1210 ml          Physical Examination:     I had a face to face encounter with this patient and independently examined them on 12/21/2022 as outlined below:          Constitutional:  No acute distress   ENT:  Oral mucosa moist, oropharynx benign. Resp:  CTA bilaterally. No wheezing/rhonchi/rales. No accessory muscle use. CV:  Regular rhythm, normal rate, no murmurs, gallops, rubs    GI:  Soft, non distended, non tender. normoactive bowel sounds, no hepatosplenomegaly     Musculoskeletal:  No edema, warm, 2+ pulses throughout    Neurologic:  Moves all extremities.   AAOx3, CN II-XII reviewed, drowsy but arousable            Data Review:    Review and/or order of clinical lab test      Labs:     Recent Labs     12/21/22  0230 12/20/22 1958 12/20/22  0012   WBC 5.5  --  5.5   HGB 7.5* 9.5* 7.0*   HCT 22.6* 29.4* 22.1*     --  193       Recent Labs     12/21/22  0230 12/20/22  0012 12/19/22  1216    141 140   K 3.4* 3.4* 3.8    112* 111*   CO2 24 25 25   BUN 11 19 30*   CREA 0.59* 0.64* 0.76   GLU 98 115* 171*   CA 8.4* 8.1* 7.6*   MG  --  2.5*  --    PHOS  --  2.8  --        Recent Labs     12/21/22  0230 12/19/22  1216 12/18/22  2140   ALT 29 25 29    89 114   TBILI 0.8 0.6 0.7   TP 6.1* 5.5* 7.0   ALB 2.8* 2.8* 3.4*   GLOB 3.3 2.7 3.6   LPSE  --  265  --        No results for input(s): INR, PTP, APTT, INREXT, INREXT in the last 72 hours. Recent Labs     12/19/22  1216   TIBC 271   PSAT 24   FERR 135        Lab Results   Component Value Date/Time    Folate 17.0 12/19/2022 12:16 PM        No results for input(s): PH, PCO2, PO2 in the last 72 hours. No results for input(s): CPK, CKNDX, TROIQ in the last 72 hours.     No lab exists for component: CPKMB  Lab Results   Component Value Date/Time    Cholesterol, total 141 12/19/2022 12:16 PM    HDL Cholesterol 36 12/19/2022 12:16 PM    LDL, calculated 69.8 12/19/2022 12:16 PM    Triglyceride 176 (H) 12/19/2022 12:16 PM    CHOL/HDL Ratio 3.9 12/19/2022 12:16 PM     Lab Results   Component Value Date/Time    Glucose (POC) 113 12/21/2022 08:07 AM    Glucose (POC) 152 (H) 12/20/2022 09:17 PM    Glucose (POC) 182 (H) 12/20/2022 05:40 PM    Glucose (POC) 121 (H) 12/20/2022 12:23 PM    Glucose (POC) 124 (H) 12/20/2022 08:59 AM     Lab Results   Component Value Date/Time    Color YELLOW/STRAW 12/19/2022 10:54 AM    Appearance CLEAR 12/19/2022 10:54 AM    Specific gravity 1.022 12/19/2022 10:54 AM    pH (UA) 5.0 12/19/2022 10:54 AM    Protein Negative 12/19/2022 10:54 AM    Glucose Negative 12/19/2022 10:54 AM    Ketone Negative 12/19/2022 10:54 AM    Bilirubin Negative 12/19/2022 10:54 AM    Urobilinogen 0.2 12/19/2022 10:54 AM    Nitrites Negative 12/19/2022 10:54 AM    Leukocyte Esterase Negative 12/19/2022 10:54 AM    Epithelial cells FEW 12/19/2022 10:54 AM    Bacteria Negative 12/19/2022 10:54 AM    WBC 0-4 12/19/2022 10:54 AM    RBC 0-5 12/19/2022 10:54 AM         Medications Reviewed:     Current Facility-Administered Medications   Medication Dose Route Frequency    sodium chloride (NS) flush 5-40 mL  5-40 mL IntraVENous Q8H    sodium chloride (NS) flush 5-40 mL  5-40 mL IntraVENous PRN    acetaminophen (TYLENOL) tablet 650 mg  650 mg Oral Q6H PRN    Or    acetaminophen (TYLENOL) suppository 650 mg  650 mg Rectal Q6H PRN    polyethylene glycol (MIRALAX) packet 17 g  17 g Oral DAILY PRN    ondansetron (ZOFRAN ODT) tablet 4 mg  4 mg Oral Q8H PRN    Or    ondansetron (ZOFRAN) injection 4 mg  4 mg IntraVENous Q6H PRN    insulin lispro (HUMALOG) injection   SubCUTAneous AC&HS    glucose chewable tablet 16 g  4 Tablet Oral PRN    glucagon (GLUCAGEN) injection 1 mg  1 mg IntraMUSCular PRN    pantoprazole (PROTONIX) 40 mg in 0.9% sodium chloride 10 mL injection  40 mg IntraVENous Q12H    dextrose 10 % infusion 0-250 mL  0-250 mL IntraVENous PRN    lactated Ringers 4,053 mL with folic acid 1 mg, thiamine 100 mg, mvi (adult no. 4 with vit K) 10 mL infusion   IntraVENous DAILY    diazePAM (VALIUM) injection 5 mg  5 mg IntraVENous Q4H PRN    0.9% sodium chloride infusion 250 mL  250 mL IntraVENous PRN     ______________________________________________________________________  EXPECTED LENGTH OF STAY: - - -  ACTUAL LENGTH OF STAY:          2                 Josephus Mcardle, MD

## 2022-12-21 NOTE — PROGRESS NOTES
Bedside shift change report given to COTY Drake and Mayra Wolf RN (oncoming nurse) by Isidro Gray RN (offgoing nurse). Report included the following information SBAR, Kardex, MAR, Recent Results, and Cardiac Rhythm NSR .

## 2022-12-21 NOTE — ANESTHESIA PREPROCEDURE EVALUATION
Relevant Problems   No relevant active problems       Anesthetic History   No history of anesthetic complications            Review of Systems / Medical History  Patient summary reviewed, nursing notes reviewed and pertinent labs reviewed    Pulmonary  Within defined limits                 Neuro/Psych   Within defined limits           Cardiovascular  Within defined limits                     GI/Hepatic/Renal  Within defined limits              Endo/Other    Diabetes    Obesity     Other Findings            Physical Exam    Airway  Mallampati: IV  TM Distance: 4 - 6 cm  Neck ROM: normal range of motion, short neck   Mouth opening: Normal     Cardiovascular    Rhythm: regular  Rate: normal         Dental  No notable dental hx       Pulmonary  Breath sounds clear to auscultation               Abdominal  GI exam deferred       Other Findings            Anesthetic Plan    ASA: 2  Anesthesia type: MAC          Induction: Intravenous  Anesthetic plan and risks discussed with: Patient

## 2022-12-21 NOTE — PROGRESS NOTES
OCCUPATIONAL THERAPY EVALUATION/DISCHARGE  Patient: Arnel Urias (20 y.o. male)  Date: 12/21/2022  Primary Diagnosis: Acute GI bleeding [K92.2]  Procedure(s) (LRB):  ESOPHAGOGASTRODUODENOSCOPY (EGD) (N/A)     Precautions: fall       ASSESSMENT  Based on the objective data described below, the patient presents with near baseline ADL performance with decreased standing tolerance noted, though intact balance during static standing. No more than tactile or verbal cues for safe line mgmt during transfers needed. Patient is not in need of OT services at this time. No DME needs noted. Patient's mentation affected- with patient being mildly distractible (intently staring at TV until it was turned off during interview, and touching all of his lines/leads) and perseverating on placing phone call to someone regarding his discharge. In fairness, Patient reportedly lives in [place] and he is not sure how he will get back home at this time, so call is certainly important. Current Level of Function (ADLs/self-care): set up/supervision     Functional Outcome Measure: The patient scored 70/100 on the Barthel Index outcome measure     Other factors to consider for discharge: Patient lives in [address] :  Recommend with staff: ADL in bathroom     Recommendation for discharge: (in order for the patient to meet his/her long term goals)  No skilled occupational therapy/ follow up rehabilitation needs identified at this time. This discharge recommendation:  Has been made in collaboration with the attending provider and/or case management    IF patient discharges home will need the following DME: none       SUBJECTIVE:   Patient cooperative     OBJECTIVE DATA SUMMARY:   HISTORY:   Past Medical History:   Diagnosis Date    Diabetes ([facility] [address]    No past surgical history on file. Prior Level of Function/Environment/Context: Independent, no AD.    Expanded or extensive additional review of patient history:   [facility] Environment: Apartment  # Steps to Enter: 5  One/Two Story Residence: One story  Living Alone: Yes  Support Systems: No Support Systems  Patient Expects to be Discharged to[de-identified] Home  Current DME Used/Available at Home: None      EXAMINATION OF PERFORMANCE DEFICITS:  Cognitive/Behavioral Status:  Neurologic State: Alert  Orientation Level: Oriented to person;Oriented to place  Cognition: Follows commands; Impaired decision making         Hearing: Auditory  Auditory Impairment: None    Vision/Perceptual:                                     Range of Motion:    AROM: Within functional limits                         Strength:    Strength: Generally decreased, functional                Coordination:  Coordination: Within functional limits  Fine Motor Skills-Upper: Left Intact; Right Intact    Gross Motor Skills-Upper: Left Intact; Right Intact    Tone & Sensation:    Tone: Normal  Sensation: Intact                      Balance:  Sitting: Intact; Without support  Standing: Impaired; Without support  Standing - Static: Good; Unsupported  Standing - Dynamic : Fair    Functional Mobility and Transfers for ADLs:  Bed Mobility:  Supine to Sit: Modified independent    Transfers:  Sit to Stand: Supervision  Stand to Sit: Supervision  Bed to Chair: Supervision    ADL Assessment:  Feeding: Independent    Oral Facial Hygiene/Grooming: Supervision (standing)    Bathing: Supervision        Upper Body Dressing: Setup    Lower Body Dressing: Setup    Toileting: Supervision                ADL Intervention and task modifications:     Patient's mentation affected- with patient being distractible (intently staring at TV until it was turned off during interview, and touching all of his lines/leads) and perseverating on placing phone call to someone regarding his discharge. In fairness, Patient reportedly lives in [place] and he is not sure how he will get back home at this time, so call is certainly important.  Though patient was not paying attention to whether phone was on or off during many calls. Functional Measure:    Barthel Index:  Bathin  Bladder: 5  Bowels: 10  Groomin  Dressing: 10  Feeding: 10  Mobility: 10  Stairs: 5  Toilet Use: 5  Transfer (Bed to Chair and Back): 10  Total: 70/100      The Barthel ADL Index: Guidelines  1. The index should be used as a record of what a patient does, not as a record of what a patient could do. 2. The main aim is to establish degree of independence from any help, physical or verbal, however minor and for whatever reason. 3. The need for supervision renders the patient not independent. 4. A patient's performance should be established using the best available evidence. Asking the patient, friends/relatives and nurses are the usual sources, but direct observation and common sense are also important. However direct testing is not needed. 5. Usually the patient's performance over the preceding 24-48 hours is important, but occasionally longer periods will be relevant. 6. Middle categories imply that the patient supplies over 50 per cent of the effort. 7. Use of aids to be independent is allowed. Score Interpretation (from [address] [address])    Independent   60-79 Minimally independent   40-59 Partially dependent   20-39 Very dependent   <20 Totally dependent     -Clarice Milian., Barthel, D.W. (1965). Functional evaluation: the Barthel Index. [address] [facility] ([address] (1997). The Barthel activities of daily living index: self-reporting versus actual performance in the old (> or = 75 years). Journal of [address] [place] [address] 45(7), 14 [facility], J.J.MRmF, Zach Roque, [facility]. (1999). Measuring the change in disability after inpatient rehabilitation; comparison of the responsiveness of the Barthel Index and Functional Dubois Measure. Journal of Neurology, Neurosurgery, and Psychiatry, 66(4), 380-200.   ALEJANDRA Forrest, Everett Coffey, SHANNON, & Aiden Escamilla M.A. (2004) Assessment of post-stroke quality of life in cost-effectiveness studies: The usefulness of the Barthel Index and the EuroQoL-5D. Quality of Life Research, 15, 271-12        Occupational Therapy Evaluation Charge Determination   History Examination Decision-Making   LOW Complexity : Brief history review  LOW Complexity : 1-3 performance deficits relating to physical, cognitive , or psychosocial skils that result in activity limitations and / or participation restrictions  LOW Complexity : No comorbidities that affect functional and no verbal or physical assistance needed to complete eval tasks       Based on the above components, the patient evaluation is determined to be of the following complexity level: LOW   Pain Rating:  None reported     Activity Tolerance:   requires rest breaks    After treatment patient left in no apparent distress:    Sitting in chair, Call bell within reach, and Bed / chair alarm activated    COMMUNICATION/EDUCATION:   The patients plan of care was discussed with: Registered nurse.      Thank you for this referral.  Render ERICA Rousseau  Time Calculation: 22 mins

## 2022-12-21 NOTE — DISCHARGE SUMMARY
Discharge Summary       PATIENT ID: Magali Adhikari  MRN: [id number]   YOB: 1965    DATE OF ADMISSION: 12/19/2022  2:07 AM    DATE OF DISCHARGE: 12/21/2022   PRIMARY CARE PROVIDER: None     ATTENDING PHYSICIAN: Dr Dana Franz  DISCHARGING PROVIDER: Dana Franz MD    To contact this individual call [phone] and ask the  to page. If unavailable ask to be transferred the Adult Hospitalist Department. CONSULTATIONS: IP CONSULT TO GASTROENTEROLOGY    PROCEDURES/SURGERIES: * No surgery found *    DISCHARGE DIAGNOSES:   Acute GI bleed, lower vs upper  Acute blood loss anemia  -Protonix BID  -s/p 2 units PRBC 12/19  -s/p EGD 12/21:  Esophagus:normal  Stomach: gastritis in antrum, 10 small ulcers in antrum, with clear bases, non bleeding, largest one around 1 cm in size, biopsies taken   Duodenum:  duodenitis in bulb  There was around 1 x 1.5 cm ulcer in apex of the duodenal bulb, non bleeding, with clear base  Rest of duodenum was normal     -Appreciate GI, ok to discharge with outpatient follow up. Protonix for 6 months    Alcohol withdrawal delirium: no signs of withdrawal  Hypokalemia: replace as needed  Type 2 diabetes, new onset: on SSI. Will discharge the patient on Metformin  Hyponatremia hypovolemic, now resolved  Acute kidney injury, pre-renal, now resolved  Sinus tachycardia: now resolved  GGO on CT chest: outpatient follow up  ? PK, says he has been tested but does not have PK      ADDITIONAL CARE RECOMMENDATIONS:   Follow up with PMD  Follow up with GI   Protonix for 6 months  Repeat CT chest in 4 weeks    NOTIFY YOUR PHYSICIAN FOR ANY OF THE FOLLOWING:   Fever over 101 degrees for 24 hours. Chest pain, shortness of breath, fever, chills, nausea, vomiting, diarrhea, change in mentation, falling, weakness, bleeding. Severe pain or pain not relieved by medications, as well as any other signs or symptoms that you may have questions about.     FOLLOW UP APPOINTMENTS:    Follow-up Information       Follow up With Specialties Details Why Contact Info    pcp  Follow up in 1 week(s)      Yanet Riggs MD Gastroenterology Follow up in 1 week(s)  [place]   [phone]                 DIET: Cardiac Diet    ACTIVITY: Activity as tolerated      DISCHARGE MEDICATIONS:  Current Discharge Medication List        START taking these medications    Details   pantoprazole (Protonix) 40 mg tablet Take 1 Tablet by mouth two (2) times a day. Qty: 60 Tablet, Refills: 0  Start date: 12/21/2022      thiamine HCL (B-1) 100 mg tablet Take 1 Tablet by mouth daily. Qty: 30 Tablet, Refills: 2  Start date: 00/36/9755      folic acid (FOLVITE) 1 mg tablet Take 1 Tablet by mouth daily. Qty: 30 Tablet, Refills: 2  Start date: 12/21/2022      therapeutic multivitamin [facility]) tablet Take 1 Tablet by mouth daily.   Qty: 30 Tablet, Refills: 2  Start date: 12/21/2022             DISPOSITION:  x  Home With:   OT  PT  HH  RN       Long term SNF/Inpatient Rehab    Independent/assisted living    Hospice    Other:       PATIENT CONDITION AT DISCHARGE:     Functional status    Poor     Deconditioned    x Independent      Cognition   x  Lucid     Forgetful     Dementia      Catheters/lines (plus indication)    [place]     PICC     PEG    x None      Code status    x Full code     DNR      PHYSICAL EXAMINATION AT DISCHARGE:  Please see progress note      CHRONIC MEDICAL DIAGNOSES:  Problem List as of 12/21/2022 Date Reviewed: 12/19/2022            Codes Class Noted - Resolved    * (Principal) Acute GI bleeding ICD-10-CM: K92.2  ICD-9-CM: 578.9  12/19/2022 - Present           Greater than 39 minutes were spent with the patient on counseling and coordination of care    Signed:   Sunitha Woodruff MD  12/21/2022  10:54 AM    .

## 2022-12-21 NOTE — PROGRESS NOTES
[address]  [address] [facility], [address]       GI PROGRESS NOTE  Josiane Hawk, [facility] office  [phone] NP in-hospital cell phone M-F until 4:30  After 5pm or on weekends, please call  for physician on call      NAME: Heide Jeffesr   :  1965   MRN:  [id number]       Subjective:   Patient denies GI complaint. Objective:     VITALS:   Last 24hrs VS reviewed since prior progress note. Most recent are:  Visit Vitals  BP (!) 154/95 (BP 1 Location: Left upper arm, BP Patient Position: At rest)   Pulse 83   Temp 98.4 °F (36.9 °C)   Resp 19   Wt 107.2 kg (236 lb 5.3 oz)   SpO2 98%   BMI 35.93 kg/m²       PHYSICAL EXAM:  General: no acute distress    Neurologic:  Alert   HEENT: EOMI, no scleral icterus   Lungs:  No increased WOB  Heart:  regular rate  Abdomen: Soft, non-distended, no tenderness.     Extremities: warm  Psych:   Not anxious     Lab Data Reviewed:     Recent Results (from the past 24 hour(s))   GLUCOSE, POC    Collection Time: 22  5:40 PM   Result Value Ref Range    Glucose (POC) 182 (H) 65 - 117 mg/dL    Performed by Ame ALTAMIRANO    HGB & HCT    Collection Time: 22  7:58 PM   Result Value Ref Range    HGB 9.5 (L) 12.1 - 17.0 g/dL    HCT 29.4 (L) 36.6 - 50.3 %   GLUCOSE, POC    Collection Time: 22  9:17 PM   Result Value Ref Range    Glucose (POC) 152 (H) 65 - 117 mg/dL    Performed by Da ALTAMIRANO    CBC WITH AUTOMATED DIFF    Collection Time: 22  2:30 AM   Result Value Ref Range    WBC 5.5 4.1 - 11.1 K/uL    RBC 2.51 (L) 4.10 - 5.70 M/uL    HGB 7.5 (L) 12.1 - 17.0 g/dL    HCT 22.6 (L) 36.6 - 50.3 %    MCV 90.0 80.0 - 99.0 FL    MCH 29.9 26.0 - 34.0 PG    MCHC 33.2 30.0 - 36.5 g/dL    RDW 14.1 11.5 - 14.5 %    PLATELET 282 303 - 235 K/uL    MPV 10.5 8.9 - 12.9 FL    NRBC 0.0 0  WBC    ABSOLUTE NRBC 0.00 0.00 - 0.01 K/uL    NEUTROPHILS 60 32 - 75 %    LYMPHOCYTES 28 12 - 49 %    MONOCYTES 6 5 - 13 % EOSINOPHILS 4 0 - 7 %    BASOPHILS 1 0 - 1 %    IMMATURE GRANULOCYTES 1 (H) 0.0 - 0.5 %    ABS. NEUTROPHILS 3.3 1.8 - 8.0 K/UL    ABS. LYMPHOCYTES 1.6 0.8 - 3.5 K/UL    ABS. MONOCYTES 0.3 0.0 - 1.0 K/UL    ABS. EOSINOPHILS 0.2 0.0 - 0.4 K/UL    ABS. BASOPHILS 0.0 0.0 - 0.1 K/UL    ABS. IMM. GRANS. 0.1 (H) 0.00 - 0.04 K/UL    DF AUTOMATED     METABOLIC PANEL, COMPREHENSIVE    Collection Time: 12/21/22  2:30 AM   Result Value Ref Range    Sodium 138 136 - 145 mmol/L    Potassium 3.4 (L) 3.5 - 5.1 mmol/L    Chloride 108 97 - 108 mmol/L    CO2 24 21 - 32 mmol/L    Anion gap 6 5 - 15 mmol/L    Glucose 98 65 - 100 mg/dL    BUN 11 6 - 20 MG/DL    Creatinine 0.59 (L) 0.70 - 1.30 MG/DL    BUN/Creatinine ratio 19 12 - 20      eGFR >60 >60 ml/min/1.73m2    Calcium 8.4 (L) 8.5 - 10.1 MG/DL    Bilirubin, total 0.8 0.2 - 1.0 MG/DL    ALT (SGPT) 29 12 - 78 U/L    AST (SGOT) 23 15 - 37 U/L    Alk. phosphatase 103 45 - 117 U/L    Protein, total 6.1 (L) 6.4 - 8.2 g/dL    Albumin 2.8 (L) 3.5 - 5.0 g/dL    Globulin 3.3 2.0 - 4.0 g/dL    A-G Ratio 0.8 (L) 1.1 - 2.2     GLUCOSE, POC    Collection Time: 12/21/22  8:07 AM   Result Value Ref Range    Glucose (POC) 113 65 - 117 mg/dL    Performed by Susan ALTAMIRANO    HGB & HCT    Collection Time: 12/21/22 10:33 AM   Result Value Ref Range    HGB 7.6 (L) 12.1 - 17.0 g/dL    HCT 22.3 (L) 36.6 - 50.3 %   GLUCOSE, POC    Collection Time: 12/21/22 11:40 AM   Result Value Ref Range    Glucose (POC) 105 65 - 117 mg/dL    Performed by [facility]             Assessment:     Anemia with hemoccult positive stool: Hgb 6.3-->7.6 status post 1 unit pRBC's. CT abdomen/pelvis without contrast (12/18/22): no acute process; hepatic steatosis.    Altered mental status   Reported alcohol abuse  Diabetes mellitus  Elevated D-dimer: CTA chest pending        Patient Active Problem List   Diagnosis Code    Acute GI bleeding K92.2     Plan:     BID PPI  Monitor CBC  NPO  Will try to do EGD today but may get bumped to tomorrow due to endoscopy schedule/anesthesia availability      Signed By: Sue Domingo NP     12/21/2022  10:22 AM          Patient was seen and examined  Agree with above  EGD today

## 2022-12-21 NOTE — PERIOP NOTES

## 2022-12-21 NOTE — PROGRESS NOTES
0000 Report received from Eddie Alfred, [facility]. Patient alert and oriented x4, resting in bed and no needs expressed. 2910 Bedside shift change report given to Tylorjuan manuel Vinod by Elsi Perez RN. Report included the following information SBAR, Kardex, MAR, Accordion, and Recent Results and Cardiac Rhythm NSR. Problem: Falls - Risk of  Goal: *Absence of Falls  Description: Document Virlinda Gamma Fall Risk and appropriate interventions in the flowsheet.   Outcome: Progressing Towards Goal  Note: Fall Risk Interventions:  Mobility Interventions: Bed/chair exit alarm, Communicate number of staff needed for ambulation/transfer    Mentation Interventions: Adequate sleep, hydration, pain control, Bed/chair exit alarm, Evaluate medications/consider consulting pharmacy    Medication Interventions: Patient to call before getting OOB, Teach patient to arise slowly    Elimination Interventions: Call light in reach, Patient to call for help with toileting needs    Problem: Alcohol Withdrawal  Goal: *STG: Participates in treatment plan  Outcome: Progressing Towards Goal     Problem: Upper and Lower GI Bleed: Day 2  Goal: Consults, if ordered  Outcome: Progressing Towards Goal

## 2022-12-22 LAB
GLUCOSE BLD STRIP.AUTO-MCNC: 121 MG/DL (ref 65–117)
GLUCOSE BLD STRIP.AUTO-MCNC: 215 MG/DL (ref 65–117)
SERVICE CMNT-IMP: ABNORMAL
SERVICE CMNT-IMP: ABNORMAL

## 2022-12-22 PROCEDURE — 65660000001 HC RM ICU INTERMED STEPDOWN

## 2022-12-22 PROCEDURE — 74011636637 HC RX REV CODE- 636/637: Performed by: INTERNAL MEDICINE

## 2022-12-22 PROCEDURE — 74011250637 HC RX REV CODE- 250/637: Performed by: INTERNAL MEDICINE

## 2022-12-22 PROCEDURE — 74011000250 HC RX REV CODE- 250: Performed by: INTERNAL MEDICINE

## 2022-12-22 PROCEDURE — 97116 GAIT TRAINING THERAPY: CPT

## 2022-12-22 PROCEDURE — 82962 GLUCOSE BLOOD TEST: CPT

## 2022-12-22 PROCEDURE — C9113 INJ PANTOPRAZOLE SODIUM, VIA: HCPCS | Performed by: INTERNAL MEDICINE

## 2022-12-22 PROCEDURE — 97161 PT EVAL LOW COMPLEX 20 MIN: CPT

## 2022-12-22 PROCEDURE — 74011250637 HC RX REV CODE- 250/637: Performed by: HOSPITALIST

## 2022-12-22 PROCEDURE — 74011250636 HC RX REV CODE- 250/636: Performed by: INTERNAL MEDICINE

## 2022-12-22 RX ORDER — POTASSIUM CHLORIDE 750 MG/1
40 TABLET, FILM COATED, EXTENDED RELEASE ORAL
Status: COMPLETED | OUTPATIENT
Start: 2022-12-22 | End: 2022-12-22

## 2022-12-22 RX ORDER — METFORMIN HYDROCHLORIDE 500 MG/1
500 TABLET ORAL 2 TIMES DAILY WITH MEALS
Qty: 60 TABLET | Refills: 0 | Status: SHIPPED | OUTPATIENT
Start: 2022-12-22

## 2022-12-22 RX ORDER — PANTOPRAZOLE SODIUM 40 MG/1
40 TABLET, DELAYED RELEASE ORAL
Status: DISCONTINUED | OUTPATIENT
Start: 2022-12-22 | End: 2022-12-23 | Stop reason: HOSPADM

## 2022-12-22 RX ADMIN — POTASSIUM CHLORIDE 40 MEQ: 750 TABLET, FILM COATED, EXTENDED RELEASE ORAL at 09:47

## 2022-12-22 RX ADMIN — PANTOPRAZOLE SODIUM 40 MG: 40 INJECTION, POWDER, FOR SOLUTION INTRAVENOUS at 09:47

## 2022-12-22 RX ADMIN — Medication 3 UNITS: at 11:33

## 2022-12-22 RX ADMIN — PANTOPRAZOLE SODIUM 40 MG: 40 TABLET, DELAYED RELEASE ORAL at 21:42

## 2022-12-22 NOTE — PROGRESS NOTES
Orders received, chart reviewed and patient evaluated by physical therapy. Pending progression with skilled acute physical therapy, recommend:  No skilled physical therapy/ follow up rehabilitation needs identified at this time. Recommend with nursing OOB to chair 3x/day and walking daily with x 1  assist and  gait belt . Thank you for completing as able in order to maintain patient strength, endurance and independence. Full evaluation to follow.   Den Santiago, PT      Vitals:      12/22/22 1024 12/22/22 1040   BP:   125/79 117/82   BP 1 Location:   Right upper arm Right upper arm   BP Patient Position:   At rest;Semi fowlers Sitting  Comment: post eval   Pulse:   90 (!) 107   Temp:       Resp:       Height:       Weight:       SpO2:on room air   97% 95%

## 2022-12-22 NOTE — ROUTINE PROCESS
PHYSICAL THERAPY EVALUATION/DISCHARGE  Patient: Judson Corea (58 y.o. male)  Date: 12/22/2022  Primary Diagnosis: Acute GI bleeding [K92.2]  Procedure(s) (LRB):  ESOPHAGOGASTRODUODENOSCOPY (EGD) (N/A)  ESOPHAGOGASTRODUODENAL (EGD) BIOPSY (N/A) 1 Day Post-Op   Precautions:   Seizure (low fall risk per Tinetti, CIWA)      ASSESSMENT  Based on the objective data described below, the patient presents with no need for any assist for mobility. Of note, he was received saturated in urine (see subjective). Vital signs monitored and stable and pt completed a Tinetti. No PT needs identified, can clear him for discharge. .    Vitals:         12/22/22 1024 12/22/22 1040   BP:     125/79 117/82   BP 1 Location:     Right upper arm Right upper arm   BP Patient Position:     At rest;Semi fowlers Sitting  Comment: post eval   Pulse:     90 (!) 107   Temp:           Resp:           Height:           Weight:           SpO2:on room air     97% 95%              Functional Outcome Measure: The patient scored 25/28 on the Tinetti outcome measure which is indicative of low fall risk. Other factors to consider for discharge: Lives out of state (in [place])     Further skilled acute physical therapy is not indicated at this time. PLAN :  Recommendation for discharge: (in order for the patient to meet his/her long term goals)  No skilled physical therapy/ follow up rehabilitation needs identified at this time. This discharge recommendation:  A follow-up discussion with the attending provider and/or case management is planned    IF patient discharges home will need the following DME: none       SUBJECTIVE:   Patient stated Are you going to give me bath? Rodger Velazquez pt inquired about this multiple times despite my clarity that I was not going to give him a bath, discussed with pt's nurse.     OBJECTIVE DATA SUMMARY:   Consult received, chart reviewed, pt cleared by nursing  HISTORY:    Past Medical History:   Diagnosis Date    Diabetes [facility])    History reviewed. No pertinent surgical history. Prior level of function: independent and working, drives a truck. Personal factors and/or comorbidities impacting plan of care: Lives out of state (in [place])    [address]: Apartment  # Steps to Enter: 2  Rails to Enter: Yes  Hand Rails : Bilateral  One/Two Story Residence: One story  Living Alone: Yes  Support Systems: No Support Systems  Patient Expects to be Discharged to[de-identified] Home  Current DME Used/Available at Home: None    EXAMINATION/PRESENTATION/DECISION MAKING:   Critical Behavior:  Neurologic State: Alert  Orientation Level: Oriented X4  Cognition: Follows commands  Safety/Judgement: Fall prevention  Hearing: Auditory  Auditory Impairment: None  Skin:  refer to MD and nursing notes  Edema: refer to MD and nursing notes  Range Of Motion:  AROM: Within functional limits                       Strength:    Strength: Within functional limits                    Tone & Sensation:                                  Coordination:     Vision:      Functional Mobility:  Bed Mobility:     Supine to Sit: Modified independent        Transfers:  Sit to Stand: Supervision  Stand to Sit: Supervision                       Balance:   Sitting: Intact; Without support  Standing: Intact; Without support  Ambulation/Gait Training:  Distance (ft): 40 Feet (ft)  Assistive Device: Gait belt  Ambulation - Level of Assistance: Supervision        Gait Abnormalities:  (none)        Base of Support: Widened     Speed/Lisbeth: Slow  Step Length: Left shortened;Right shortened                     Stairs:               Therapeutic Exercises:       Functional Measure:  Tinetti test:    Sitting Balance: 1  Arises: 1  Attempts to Rise: 2  Immediate Standing Balance: 2  Standing Balance: 2  Nudged: 2  Eyes Closed: 1  Turn 360 Degrees - Continuous/Discontinuous: 1  Turn 360 Degrees - Steady/Unsteady: 1  Sitting Down: 1  Balance Score: 14 Balance total score  Indication of Gait: 1  R Step Length/Height: 1  L Step Length/Height: 1  R Foot Clearance: 1  L Foot Clearance: 1  Step Symmetry: 1  Step Continuity: 1  Path: 2  Trunk: 2  Walking Time: 0  Gait Score: 11 Gait total score  Total Score: 25/28 Overall total score         Tinetti Tool Score Risk of Falls  <19 = High Fall Risk  19-24 = Moderate Fall Risk  25-28 = Low Fall Risk  Tinetti ME. Performance-Oriented Assessment of Mobility Problems in Elderly Patients. [facility] 66; [id number]. (Scoring Description: PT Bulletin Feb. 10, 1993)    Older adults: Woodland Zia et al, 2009; n = 1000 [facility] elderly evaluated with ABC, CHRISTA, ADL, and IADL)  · Mean CHRISTA score for males aged 69-68 years = 26.21(3.40)  · Mean CHRISTA score for females age 69-68 years = 25.16(4.30)  · Mean CHRISTA score for males over 80 years = 23.29(6.02)  · Mean CHRISTA score for females over 80 years = 17.20(8.32)           Physical Therapy Evaluation Charge Determination   History Examination Presentation Decision-Making   MEDIUM  Complexity : 1-2 comorbidities / personal factors will impact the outcome/ POC  MEDIUM Complexity : 3 Standardized tests and measures addressing body structure, function, activity limitation and / or participation in recreation  LOW Complexity : Stable, uncomplicated  LOW Complexity : FOTO score of       Based on the above components, the patient evaluation is determined to be of the following complexity level: LOW     Pain Rating:  None rated    Activity Tolerance:   Good      After treatment patient left in no apparent distress:   Sitting in chair, Call bell within reach, and Bed / chair alarm activated    COMMUNICATION/EDUCATION:   The patients plan of care was discussed with: Registered nurse and Case management. Fall prevention education was provided and the patient/caregiver indicated understanding.     Thank you for this referral.  Liu Jiang   Time Calculation: 25 mins

## 2022-12-22 NOTE — PROGRESS NOTES
Transition of Care    Reason for Admission:  Weakness and Fatigue                   RUR Score:     13%                Plan for utilizing home health:  Mr. Ken Prajapati lives in [place] and does not have a primary care giver. PCP: First and Last name:  None     Name of Practice:    Are you a current patient: Yes/No:    Approximate date of last visit:    Can you participate in a virtual visit with your PCP:                     Current Advanced Directive/Advance Care Plan: Full Code  Mr. Ken Prajapati does not have an advance care plan. He declined to receive information about completing an advance care plan. Healthcare Decision Maker:   Click here to complete [address] including selection of the Healthcare Decision Maker Relationship (ie \"Primary\")   Mr. Ba Galeas is his employer, [employer] ([phone])                     Transition of Care Plan:  Mr. Ken Prajapati was seen in his room. He address and phone number were verified. He lives by himself in a one story, single family residence with 2 steps to enter. He is employed as a . He does drive. He was independent with his ADLs and IADLs prior to his admission. His pharmacy is [facility]. He is able to afford and acquire his medications.       Care Management Interventions  PCP Verified by CM: No  Palliative Care Criteria Met (RRAT>21 & CHF Dx)?: No  Mode of Transport at Discharge: Self  Transition of Care Consult (CM Consult): Discharge Planning  MyChart Signup: No  Discharge Durable Medical Equipment: No  Physical Therapy Consult: Yes  Occupational Therapy Consult: Yes  Speech Therapy Consult: No  Support Systems: Friend/Neighbor  Confirm Follow Up Transport: Self  The Patient and/or Patient Representative was Provided with a Choice of Provider and Agrees with the Discharge Plan?: No  Freedom of Choice List was Provided with Basic Dialogue that Supports the Patient's Individualized Plan of Care/Goals, Treatment Preferences and Shares the Quality Data Associated with the Providers?: No   Resource Information Provided?: No  Discharge Location  Patient Expects to be Discharged to[de-identified] Home    Ms. Madan Fregoso was informed that he was being discharged today. He stated that his employer, Francisca Lawson, was on his way from [facility] to pick him up. Jensen was called with Mr. Madan Fregoso. A message was left for Jensen to call this . 1:30 pm     Jensen was called. A message was left for Jensen to call me back. 2:00 pm    Jensen was called. A message was left for Jensen to call me back. 4:00 pm    Jensen was called. A message was left for Jensen to call me back. 4:45 pm    Jensen was called. A message was left for Jensen to call me back. Will continue to follow for discharge planning.   Signed By: Haylee Moura LCSW     December 22, 2022

## 2022-12-22 NOTE — PROGRESS NOTES
[facility] Adult  Hospitalist Group                                                                                          Hospitalist Progress Note  Terrance Brar MD  Answering service: [phone] from in house phone        Date of Service:  2022  NAME:  ePrico Mckinney  :  1965  MRN:  [id number]      Admission Summary: This 26-year-old man with past medical history significant for type 2 diabetes presented at the [facility] emergency room with weakness and fatigue. The patient was initially seen and discharged from the emergency room. The patient came back for the second time because of the worsening weakness and fatigue. The weakness was described as generalized nonfocal weakness. When the patient arrived at the [facility] emergency room for the second time, the patient had a drop in his hemoglobin by one gram and he was also guaiac positive. The patient has history of significant alcohol abuse. The patient was then sent to [facility] emergency room to be evaluated for admission. The patient has no record of prior admission to this hospital.  When the patient arrived at the emergency room, there was a further drop in the patient's hemoglobin from 8.6 to 7.7. The patient was referred to the hospitalist service for admission. No associated abdominal pain. The patient denies past history of GI bleed. The patient appears a little bit confused and unable to provide good history. It is not clear whether the patient has had colonoscopy done in the past.     Interval history / Subjective:     Patient seen and examined.   He said he feels better, no abdominal pain     Assessment & Plan:     Acute GI bleed, lower vs upper  Acute blood loss anemia  -Continue protonix 40 mg twice daily, patient is advised to continue for 6 months and to avoid any NSAID  -s/p 2 units PRBC   -s/p EGD on  with multiple gastric ulcers  -GI on board    Alcohol withdrawal delirium  -Stable, no signs of withdrawal    Hypokalemia  -replace with PO KCL    T2DM  -new onset, A1c 8.6  -On a sliding scale, monitor fingerstick glucose  -Metformin 500 mg p.o. twice daily on discharge and outpatient follow-up with PCP    Hyponatremia hypovolemic,   -now resolved    ESDRAS pre-renal,   -now resolved with IV fluids  -Avoid nephrotoxin    Sinus tachycardia:   -now resolved    Groundglass opacities on CT scan  -Unclear etiology  -Saturation of oxygen 90 to 97% on room air  -COVID-19, influenza A and B- by PCR  -Afebrile, no leukocytosis  -Advised outpatient follow-up       Code status: FULL CODE  Prophylaxis: SCD    Care Plan discussed with: Patient, nurse and CM  Anticipated Disposition: home likely today     Hospital Problems  Date Reviewed: 12/19/2022            Codes Class Noted POA    * (Principal) Acute GI bleeding ICD-10-CM: K92.2  ICD-9-CM: 578.9  12/19/2022 Yes           Vital Signs:    Last 24hrs VS reviewed since prior progress note. Most recent are:  Visit Vitals  /75 (BP 1 Location: Right upper arm, BP Patient Position: At rest)   Pulse 77   Temp 98.1 °F (36.7 °C)   Resp 15   Ht 5' 8\" (1.727 m)   Wt 103.7 kg (228 lb 11.2 oz)   SpO2 90%   BMI 34.77 kg/m²         Intake/Output Summary (Last 24 hours) at 12/22/2022 0945  Last data filed at 12/21/2022 2327  Gross per 24 hour   Intake --   Output 950 ml   Net -950 ml          Physical Examination:     I had a face to face encounter with this patient and independently examined them on 12/22/2022 as outlined below:          Constitutional:  No acute distress   ENT:  Oral mucosa moist, oropharynx benign. Resp:  CTA bilaterally. No wheezing/rhonchi/rales. No accessory muscle use. CV:  Regular rhythm, normal rate, no murmurs, gallops, rubs    GI:  Soft, non distended, non tender.  normoactive bowel sounds, no hepatosplenomegaly     Musculoskeletal:  No edema, warm, 2+ pulses throughout    Neurologic: Conscious and alert, moves all extremities. AAOx3, CN II-XII reviewed, drowsy but arousable            Data Review:    Review and/or order of clinical lab test      Labs:     Recent Labs     12/21/22  1033 12/21/22  0230 12/20/22 1958 12/20/22  0012   WBC  --  5.5  --  5.5   HGB 7.6* 7.5*   < > 7.0*   HCT 22.3* 22.6*   < > 22.1*   PLT  --  199  --  193    < > = values in this interval not displayed. Recent Labs     12/21/22  0230 12/20/22  0012 12/19/22  1216    141 140   K 3.4* 3.4* 3.8    112* 111*   CO2 24 25 25   BUN 11 19 30*   CREA 0.59* 0.64* 0.76   GLU 98 115* 171*   CA 8.4* 8.1* 7.6*   MG  --  2.5*  --    PHOS  --  2.8  --        Recent Labs     12/21/22  0230 12/19/22  1216   ALT 29 25    89   TBILI 0.8 0.6   TP 6.1* 5.5*   ALB 2.8* 2.8*   GLOB 3.3 2.7   LPSE  --  265       No results for input(s): INR, PTP, APTT, INREXT, INREXT in the last 72 hours. Recent Labs     12/19/22  1216   TIBC 271   PSAT 24   FERR 135        Lab Results   Component Value Date/Time    Folate 17.0 12/19/2022 12:16 PM        No results for input(s): PH, PCO2, PO2 in the last 72 hours. No results for input(s): CPK, CKNDX, TROIQ in the last 72 hours.     No lab exists for component: CPKMB  Lab Results   Component Value Date/Time    Cholesterol, total 141 12/19/2022 12:16 PM    HDL Cholesterol 36 12/19/2022 12:16 PM    LDL, calculated 69.8 12/19/2022 12:16 PM    Triglyceride 176 (H) 12/19/2022 12:16 PM    CHOL/HDL Ratio 3.9 12/19/2022 12:16 PM     Lab Results   Component Value Date/Time    Glucose (POC) 121 (H) 12/22/2022 08:05 AM    Glucose (POC) 195 (H) 12/21/2022 09:13 PM    Glucose (POC) 105 12/21/2022 11:40 AM    Glucose (POC) 113 12/21/2022 08:07 AM    Glucose (POC) 152 (H) 12/20/2022 09:17 PM     Lab Results   Component Value Date/Time    Color YELLOW/STRAW 12/19/2022 10:54 AM    Appearance CLEAR 12/19/2022 10:54 AM    Specific gravity 1.022 12/19/2022 10:54 AM    pH (UA) 5.0 12/19/2022 10:54 AM Protein Negative 12/19/2022 10:54 AM    Glucose Negative 12/19/2022 10:54 AM    Ketone Negative 12/19/2022 10:54 AM    Bilirubin Negative 12/19/2022 10:54 AM    Urobilinogen 0.2 12/19/2022 10:54 AM    Nitrites Negative 12/19/2022 10:54 AM    Leukocyte Esterase Negative 12/19/2022 10:54 AM    Epithelial cells FEW 12/19/2022 10:54 AM    Bacteria Negative 12/19/2022 10:54 AM    WBC 0-4 12/19/2022 10:54 AM    RBC 0-5 12/19/2022 10:54 AM         Medications Reviewed:     Current Facility-Administered Medications   Medication Dose Route Frequency    potassium chloride SR (KLOR-CON 10) tablet 40 mEq  40 mEq Oral NOW    sodium chloride (NS) flush 5-40 mL  5-40 mL IntraVENous Q8H    sodium chloride (NS) flush 5-40 mL  5-40 mL IntraVENous PRN    sodium chloride (NS) flush 5-40 mL  5-40 mL IntraVENous Q8H    sodium chloride (NS) flush 5-40 mL  5-40 mL IntraVENous PRN    acetaminophen (TYLENOL) tablet 650 mg  650 mg Oral Q6H PRN    Or    acetaminophen (TYLENOL) suppository 650 mg  650 mg Rectal Q6H PRN    polyethylene glycol (MIRALAX) packet 17 g  17 g Oral DAILY PRN    ondansetron (ZOFRAN ODT) tablet 4 mg  4 mg Oral Q8H PRN    Or    ondansetron (ZOFRAN) injection 4 mg  4 mg IntraVENous Q6H PRN    insulin lispro (HUMALOG) injection   SubCUTAneous AC&HS    glucose chewable tablet 16 g  4 Tablet Oral PRN    glucagon (GLUCAGEN) injection 1 mg  1 mg IntraMUSCular PRN    pantoprazole (PROTONIX) 40 mg in 0.9% sodium chloride 10 mL injection  40 mg IntraVENous Q12H    dextrose 10 % infusion 0-250 mL  0-250 mL IntraVENous PRN    lactated Ringers 9,801 mL with folic acid 1 mg, thiamine 100 mg, mvi (adult no. 4 with vit K) 10 mL infusion   IntraVENous DAILY    diazePAM (VALIUM) injection 5 mg  5 mg IntraVENous Q4H PRN    0.9% sodium chloride infusion 250 mL  250 mL IntraVENous PRN     ______________________________________________________________________  EXPECTED LENGTH OF STAY: - - -  ACTUAL LENGTH OF STAY:          3 Radha Fung MD

## 2022-12-23 VITALS
BODY MASS INDEX: 34.66 KG/M2 | HEIGHT: 68 IN | HEART RATE: 90 BPM | TEMPERATURE: 97.8 F | SYSTOLIC BLOOD PRESSURE: 116 MMHG | OXYGEN SATURATION: 99 % | WEIGHT: 228.7 LBS | RESPIRATION RATE: 21 BRPM | DIASTOLIC BLOOD PRESSURE: 80 MMHG

## 2022-12-23 LAB
BACTERIA SPEC CULT: NORMAL
GLUCOSE BLD STRIP.AUTO-MCNC: 124 MG/DL (ref 65–117)
GLUCOSE BLD STRIP.AUTO-MCNC: 131 MG/DL (ref 65–117)
GLUCOSE BLD STRIP.AUTO-MCNC: 163 MG/DL (ref 65–117)
SERVICE CMNT-IMP: ABNORMAL
SPECIAL REQUESTS,SREQ: NORMAL

## 2022-12-23 PROCEDURE — 74011250637 HC RX REV CODE- 250/637: Performed by: HOSPITALIST

## 2022-12-23 PROCEDURE — 74011636637 HC RX REV CODE- 636/637: Performed by: INTERNAL MEDICINE

## 2022-12-23 PROCEDURE — 82962 GLUCOSE BLOOD TEST: CPT

## 2022-12-23 PROCEDURE — 74011250637 HC RX REV CODE- 250/637: Performed by: INTERNAL MEDICINE

## 2022-12-23 RX ORDER — LANOLIN ALCOHOL/MO/W.PET/CERES
100 CREAM (GRAM) TOPICAL DAILY
Status: DISCONTINUED | OUTPATIENT
Start: 2022-12-23 | End: 2022-12-23 | Stop reason: HOSPADM

## 2022-12-23 RX ORDER — FOLIC ACID 1 MG/1
1 TABLET ORAL DAILY
Status: DISCONTINUED | OUTPATIENT
Start: 2022-12-23 | End: 2022-12-23 | Stop reason: HOSPADM

## 2022-12-23 RX ORDER — THERA TABS 400 MCG
1 TAB ORAL DAILY
Status: DISCONTINUED | OUTPATIENT
Start: 2022-12-23 | End: 2022-12-23 | Stop reason: HOSPADM

## 2022-12-23 RX ADMIN — Medication 100 MG: at 11:39

## 2022-12-23 RX ADMIN — THERA TABS 1 TABLET: TAB at 11:39

## 2022-12-23 RX ADMIN — FOLIC ACID 1 MG: 1 TABLET ORAL at 11:39

## 2022-12-23 RX ADMIN — Medication 2 UNITS: at 14:38

## 2022-12-23 RX ADMIN — PANTOPRAZOLE SODIUM 40 MG: 40 TABLET, DELAYED RELEASE ORAL at 07:23

## 2022-12-23 NOTE — PROGRESS NOTES
RN inquired into location of patients hotel in [facility]. Patient was unable to name or provide an address but stated it was, \"[address] and across the [address] from a \" TA or  station\" and \"by a [place]. \" RN was not able to confirm the location/name with the patient by the information provided.

## 2022-12-23 NOTE — PROGRESS NOTES
CM contacted Volunteer services and they asked The [address] [place] [address] Avenue[address] if patient could stay there. CM received a link from Jayla Hilliard at the [address] [place] [address],[address] to fill out on patient. Awaiting confirmed acceptance. Yanet Angel.  Jana, [address] [place] [address]

## 2022-12-23 NOTE — PROGRESS NOTES
[address] [facility] Adult  Hospitalist Group                                                                                          Hospitalist Progress Note  Anuja Charles MD  Answering service: [phone] from in house phone        Date of Service:  2022  NAME:  Nasir Gamez  :  1965  MRN:  [id number]      Admission Summary: This 61-year-old man with past medical history significant for type 2 diabetes presented at the [facility] emergency room with weakness and fatigue. The patient was initially seen and discharged from the emergency room. The patient came back for the second time because of the worsening weakness and fatigue. The weakness was described as generalized nonfocal weakness. [facility], the patient had a drop in his hemoglobin by one gram and he was also guaiac positive. The patient has history of significant alcohol abuse. The patient was then sent to [facility] emergency room to be evaluated for admission. The patient has no record of prior admission to this hospital.  When the patient arrived at the emergency room, there was a further drop in the patient's hemoglobin from 8.6 to 7.7. The patient was referred to the hospitalist service for admission. No associated abdominal pain. The patient denies past history of GI bleed. The patient appears a little bit confused and unable to provide good history. It is not clear whether the patient has had colonoscopy done in the past.     Interval history / Subjective:     Patient seen and examined.   He said he feels better, no abdominal pain  He said he is told that his ride will come from [facility] but he doesn't know when, he said he couldn't reach his manager, Jensen at [phone],   CM on board and working on his discharge planning      Assessment & Plan:     Acute GI bleed, lower vs upper  Acute blood loss anemia  -Continue protonix 40 mg twice daily, patient is advised to continue for 6 months and to avoid any NSAID  -s/p 2 units PRBC 12/19  -s/p EGD on 12/21 with multiple gastric ulcers  -GI on board    Alcohol withdrawal delirium  -Stable, no signs of withdrawal    Hypokalemia  -replace with PO KCL    T2DM  -new onset, A1c 8.6  -On a sliding scale, monitor fingerstick glucose  -Metformin 500 mg p.o. twice daily on discharge and outpatient follow-up with PCP    Hyponatremia hypovolemic,   -now resolved    ESDRAS pre-renal,   -now resolved with IV fluids  -Avoid nephrotoxin    Sinus tachycardia:   -now resolved    Groundglass opacities on CT scan  -Unclear etiology  -Saturation of oxygen 90 to 97% on room air  -COVID-19, influenza A and B- by PCR  -Afebrile, no leukocytosis  -Advised outpatient follow-up       Code status: FULL CODE  Prophylaxis: SCD    Care Plan discussed with: Patient, nurse and CM  Anticipated Disposition: home likely today     Hospital Problems  Date Reviewed: 12/19/2022            Codes Class Noted POA    * (Principal) Acute GI bleeding ICD-10-CM: K92.2  ICD-9-CM: 578.9  12/19/2022 Yes         Vital Signs:    Last 24hrs VS reviewed since prior progress note. Most recent are:  Visit Vitals  /71 (BP 1 Location: Left upper arm, BP Patient Position: Lying)   Pulse 91   Temp 98.2 °F (36.8 °C)   Resp 13   Ht 5' 8\" (1.727 m)   Wt 103.7 kg (228 lb 11.2 oz)   SpO2 95%   BMI 34.77 kg/m²       No intake or output data in the 24 hours ending 12/23/22 1028       Physical Examination:     I had a face to face encounter with this patient and independently examined them on 12/23/2022 as outlined below:          Constitutional:  No acute distress   ENT:  Oral mucosa moist, oropharynx benign. Resp:  CTA bilaterally. No wheezing/rhonchi/rales. No accessory muscle use. CV:  Regular rhythm, normal rate, no murmurs, gallops, rubs    GI:  Soft, non distended, non tender.  normoactive bowel sounds, no hepatosplenomegaly     Musculoskeletal:  No edema, warm, 2+ pulses throughout    Neurologic: Conscious and alert, moves all extremities. AAOx3, CN II-XII reviewed, drowsy but arousable            Data Review:    Review and/or order of clinical lab test      Labs:     Recent Labs     12/21/22  1033 12/21/22  0230   WBC  --  5.5   HGB 7.6* 7.5*   HCT 22.3* 22.6*   PLT  --  199       Recent Labs     12/21/22  0230      K 3.4*      CO2 24   BUN 11   CREA 0.59*   GLU 98   CA 8.4*       Recent Labs     12/21/22  0230   ALT 29      TBILI 0.8   TP 6.1*   ALB 2.8*   GLOB 3.3       No results for input(s): INR, PTP, APTT, INREXT, INREXT in the last 72 hours. No results for input(s): FE, TIBC, PSAT, FERR in the last 72 hours. Lab Results   Component Value Date/Time    Folate 17.0 12/19/2022 12:16 PM        No results for input(s): PH, PCO2, PO2 in the last 72 hours. No results for input(s): CPK, CKNDX, TROIQ in the last 72 hours.     No lab exists for component: CPKMB  Lab Results   Component Value Date/Time    Cholesterol, total 141 12/19/2022 12:16 PM    HDL Cholesterol 36 12/19/2022 12:16 PM    LDL, calculated 69.8 12/19/2022 12:16 PM    Triglyceride 176 (H) 12/19/2022 12:16 PM    CHOL/HDL Ratio 3.9 12/19/2022 12:16 PM     Lab Results   Component Value Date/Time    Glucose (POC) 124 (H) 12/23/2022 08:30 AM    Glucose (POC) 131 (H) 12/23/2022 12:02 AM    Glucose (POC) 215 (H) 12/22/2022 11:22 AM    Glucose (POC) 121 (H) 12/22/2022 08:05 AM    Glucose (POC) 195 (H) 12/21/2022 09:13 PM     Lab Results   Component Value Date/Time    Color YELLOW/STRAW 12/19/2022 10:54 AM    Appearance CLEAR 12/19/2022 10:54 AM    Specific gravity 1.022 12/19/2022 10:54 AM    pH (UA) 5.0 12/19/2022 10:54 AM    Protein Negative 12/19/2022 10:54 AM    Glucose Negative 12/19/2022 10:54 AM    Ketone Negative 12/19/2022 10:54 AM    Bilirubin Negative 12/19/2022 10:54 AM    Urobilinogen 0.2 12/19/2022 10:54 AM    Nitrites Negative 12/19/2022 10:54 AM    Leukocyte Esterase Negative 12/19/2022 10:54 AM    Epithelial cells FEW 12/19/2022 10:54 AM    Bacteria Negative 12/19/2022 10:54 AM    WBC 0-4 12/19/2022 10:54 AM    RBC 0-5 12/19/2022 10:54 AM         Medications Reviewed:     Current Facility-Administered Medications   Medication Dose Route Frequency    thiamine HCL (B-1) tablet 100 mg  100 mg Oral DAILY    folic acid (FOLVITE) tablet 1 mg  1 mg Oral DAILY    therapeutic multivitamin (THERAGRAN) tablet 1 Tablet  1 Tablet Oral DAILY    pantoprazole (PROTONIX) tablet 40 mg  40 mg Oral ACB&D    sodium chloride (NS) flush 5-40 mL  5-40 mL IntraVENous Q8H    sodium chloride (NS) flush 5-40 mL  5-40 mL IntraVENous PRN    sodium chloride (NS) flush 5-40 mL  5-40 mL IntraVENous Q8H    sodium chloride (NS) flush 5-40 mL  5-40 mL IntraVENous PRN    acetaminophen (TYLENOL) tablet 650 mg  650 mg Oral Q6H PRN    Or    acetaminophen (TYLENOL) suppository 650 mg  650 mg Rectal Q6H PRN    polyethylene glycol (MIRALAX) packet 17 g  17 g Oral DAILY PRN    ondansetron (ZOFRAN ODT) tablet 4 mg  4 mg Oral Q8H PRN    Or    ondansetron (ZOFRAN) injection 4 mg  4 mg IntraVENous Q6H PRN    insulin lispro (HUMALOG) injection   SubCUTAneous AC&HS    glucose chewable tablet 16 g  4 Tablet Oral PRN    glucagon (GLUCAGEN) injection 1 mg  1 mg IntraMUSCular PRN    dextrose 10 % infusion 0-250 mL  0-250 mL IntraVENous PRN    diazePAM (VALIUM) injection 5 mg  5 mg IntraVENous Q4H PRN    0.9% sodium chloride infusion 250 mL  250 mL IntraVENous PRN     ______________________________________________________________________  EXPECTED LENGTH OF STAY: - - -  ACTUAL LENGTH OF STAY:          4                 Carter Altamirano MD

## 2022-12-23 NOTE — PROGRESS NOTES
Discharge note    Pt given and explained AVS. Pt is walking down to [facility] with pt techs to guide him.

## 2022-12-23 NOTE — PROGRESS NOTES
Clinical Pharmacy Note: IV to PO Automatic Conversion  Please note: You Fields medications- thiamine, folic acid, MVI have been changed from IV to PO based on the following critiera:    Patient is tolerating oral medications  Patient is tolerating a diet more advanced than clear liquids  Patient is not requiring vasopressors    This IV to PO conversion is based on the P&[employer] approved automatic conversion policy for eligible patients. Please call with questions.

## 2022-12-23 NOTE — PROGRESS NOTES
Care Management -     16:55 RN called CM to ask CM to assist patient with transportation. They have been unable to reach patient's boss. Patient would like to just go to his hotel. Asked RN ask for address and ask if patient has a cell phone. RN said patient said it was a hotel across the street from a [address] and beside [employer] in [facility]. Attempted [place] search for those businesses. Unable to locate "[employer]" businesses with that name in [facility]. Patient did not know the name of the street. Asked RN to call back if patient could give him an address.      MARK Atwood

## 2022-12-23 NOTE — PROGRESS NOTES
Transition Plan of Care  Discharging to the [facility] today. CM confirmed with manager of sandhya Cummings [phone]. Patient also spoke with Victor Hugo Woods. Bedside RN Gill Jasso will give discharge instructions and one PCT will escort to the [facility] next door.

## 2022-12-23 NOTE — PROGRESS NOTES
Pt states he was supposed to be discharged and has his paperwork. He sates he stayed at a hotel off [address] at the [address] in [facility].

## 2022-12-23 NOTE — PROGRESS NOTES
Bedside and Verbal shift change report given to Osvaldo Colmenares (oncoming nurse) by Ginger Suarez (offgoing nurse). Report included the following information SBAR and Kardex.

## 2022-12-24 NOTE — DISCHARGE SUMMARY
Discharge Summary       PATIENT ID: Denis Yanez  MRN: [id number]   YOB: 1965    DATE OF ADMISSION: 12/19/2022  2:07 AM    DATE OF DISCHARGE: 12/23/2022  PRIMARY CARE PROVIDER: None     ATTENDING PHYSICIAN: Norm Saravia ND   DISCHARGING PROVIDER: Aury Miranda MD    To contact this individual call [phone] and ask the  to page. If unavailable ask to be transferred the Adult Hospitalist Department. CONSULTATIONS: IP CONSULT TO GASTROENTEROLOGY    PROCEDURES/SURGERIES: Procedure(s):  ESOPHAGOGASTRODUODENOSCOPY (EGD)  ESOPHAGOGASTRODUODENAL (EGD) BIOPSY    ADMISSION SUMMARY AND HOSPITAL COURSE:     This 57-year-old man with past medical history significant for type 2 diabetes presented at the [facility] emergency room with weakness and fatigue. The patient was initially seen and discharged from the emergency room. The patient came back for the second time because of the worsening weakness and fatigue. The weakness was described as generalized nonfocal weakness. When the patient arrived at the freestanding emergency room for the second time, the patient had a drop in his hemoglobin by one gram and he was also guaiac positive. The patient has history of significant alcohol abuse. The patient was then sent to [facility] emergency room to be evaluated for admission. The patient has no record of prior admission to this hospital.  When the patient arrived at the emergency room, there was a further drop in the patient's hemoglobin from 8.6 to 7.7. The patient was referred to the hospitalist service for admission. No associated abdominal pain. The patient denies past history of GI bleed. The patient appears a little bit confused and unable to provide good history.   It is not clear whether the patient has had colonoscopy done in the past.    Acute GI bleed, lower vs upper  Acute blood loss anemia  -Continue protonix 40 mg twice daily, patient is advised to continue for 6 months and to avoid any NSAID  -s/p 2 units PRBC 12/19  -s/p EGD on 12/21 with multiple gastric ulcers  -GI on board  Alcohol withdrawal delirium  -Stable, no signs of withdrawal  Hypokalemia  -replace with PO KCL  T2DM  -new onset, A1c 8.6  -On a sliding scale, monitor fingerstick glucose  -Metformin 500 mg p.o. twice daily on discharge and outpatient follow-up with PCP  Hyponatremia hypovolemic,   -now resolved  ESDRAS pre-renal,   -now resolved with IV fluids  -Avoid nephrotoxin  Sinus tachycardia:   -now resolved  Groundglass opacities on CT scan  -Unclear etiology  -Saturation of oxygen 90 to 97% on room air  -COVID-19, influenza A and B- by PCR  -Afebrile, no leukocytosis  -Advised outpatient follow-up        Code status: FULL CODE  Prophylaxis: SCD    DISCHARGE DIAGNOSES / PLAN:      Acute GI bleed, lower vs upper  Acute blood loss anemia  -Continue protonix 40 mg twice daily, patient is advised to continue for 6 months and to avoid any NSAID  -outpatient follow up with GI  Alcohol withdrawal delirium  -Stable, no signs of withdrawal  Hypokalemia  -replaced with KCL  T2DM  --continue Metformin 500 mg p.o. twice daily on discharge and outpatient follow-up with PCP  Hyponatremia hypovolemic,   -now resolved  ESDRAS pre-renal,   -resolved  Sinus tachycardia:   -now resolved  Groundglass opacities on CT scan  -outpatient follow-up    NOTIFY YOUR PHYSICIAN FOR ANY OF THE FOLLOWING:   Fever over 101 degrees for 24 hours. Chest pain, shortness of breath, fever, chills, nausea, vomiting, diarrhea, change in mentation, falling, weakness, bleeding. Severe pain or pain not relieved by medications, as well as any other signs or symptoms that you may have questions about.     FOLLOW UP APPOINTMENTS:    Follow-up Information       Follow up With Specialties Details Why Contact Info    pcp  Follow up in 1 week(s)      Mary Forbes MD Gastroenterology Follow up in 1 week(s)  [address] [place] [address],[address] [facility]  [phone]            DIET: Regular Diet    ACTIVITY: Activity as tolerated    EQUIPMENT needed: None    DISCHARGE MEDICATIONS:  Discharge Medication List as of 12/23/2022  3:15 PM        START taking these medications    Details   metFORMIN (GLUCOPHAGE) 500 mg tablet Take 1 Tablet by mouth two (2) times daily (with meals). , Normal, Disp-60 Tablet, R-0      pantoprazole (Protonix) 40 mg tablet Take 1 Tablet by mouth two (2) times a day., Normal, Disp-60 Tablet, R-0      thiamine HCL (B-1) 100 mg tablet Take 1 Tablet by mouth daily. , Normal, Disp-30 Tablet, R-2      folic acid (FOLVITE) 1 mg tablet Take 1 Tablet by mouth daily. , Normal, Disp-30 Tablet, R-2      therapeutic multivitamin (THERAGRAN) tablet Take 1 Tablet by mouth daily. , Normal, Disp-30 Tablet, R-2             DISPOSITION:    Home With:   OT  PT  HH  RN       Long term SNF/Inpatient Rehab   x Independent/assisted living    Hospice    Other:       PATIENT CONDITION AT DISCHARGE:     Functional status    Poor     Deconditioned    x Independent      Cognition    x Lucid     Forgetful     Dementia      Catheters/lines (plus indication)    [place]     PICC     PEG    x None      Code status    x Full code     DNR      PHYSICAL EXAMINATION AT DISCHARGE:  Patient Vitals for the past 24 hrs:   Temp Pulse Resp BP SpO2   12/23/22 1440 97.8 °F (36.6 °C) 90 21 116/80 99 %   12/23/22 1118 98.2 °F (36.8 °C) 83 20 110/71 98 %   12/23/22 1000 -- 91 -- -- --   12/23/22 0905 -- -- -- -- 95 %   12/23/22 0717 98.2 °F (36.8 °C) 85 13 117/71 94 %   12/23/22 0600 -- 94 -- -- --   12/23/22 0404 98.2 °F (36.8 °C) 88 20 112/69 90 %   12/23/22 0353 -- 86 -- -- --      General:          Alert, cooperative, no distress, appears stated age.      HEENT:           Atraumatic, anicteric sclerae, pink conjunctivae                          No oral ulcers, mucosa moist, throat clear, dentition fair  Neck:               Supple, symmetrical  Lungs:             Clear to auscultation bilaterally. No Wheezing or Rhonchi. No rales. Chest wall:      No tenderness  No Accessory muscle use. Heart:              Regular  rhythm,  No  murmur   No edema  Abdomen:        Soft, non-tender. Not distended. Bowel sounds normal  Extremities:     No cyanosis. No clubbing,                            Skin turgor normal, Capillary refill normal  Skin:                Not pale. Not Jaundiced  No rashes   Psych:             Not anxious or agitated.   Neurologic:      Alert, moves all extremities, answers questions appropriately and responds to commands       Recent Results (from the past 24 hour(s))   GLUCOSE, POC    Collection Time: 12/23/22 12:02 AM   Result Value Ref Range    Glucose (POC) 131 (H) 65 - 117 mg/dL    Performed by [id number] , POC    Collection Time: 12/23/22  8:30 AM   Result Value Ref Range    Glucose (POC) 124 (H) 65 - 117 mg/dL    Performed by [facility]    GLUCOSE, POC    Collection Time: 12/23/22 11:40 AM   Result Value Ref Range    Glucose (POC) 163 (H) 65 - 117 mg/dL    Performed by [facility]       CHRONIC MEDICAL DIAGNOSES:  Problem List as of 12/23/2022 Date Reviewed: 12/19/2022            Codes Class Noted - Resolved    * (Principal) Acute GI bleeding ICD-10-CM: K92.2  ICD-9-CM: 578.9  12/19/2022 - Present           Greater than 45  minutes were spent with the patient on counseling and coordination of care    Signed:   Epifanio Mercado MD  12/23/2022  8:59 PM

## (undated) DEVICE — FCPS RAD JAW 4LC 240CM W/NDL -- BX/40

## (undated) DEVICE — TUBING HYDR IRR --